# Patient Record
Sex: MALE | Race: OTHER | HISPANIC OR LATINO | Employment: UNEMPLOYED | URBAN - METROPOLITAN AREA
[De-identification: names, ages, dates, MRNs, and addresses within clinical notes are randomized per-mention and may not be internally consistent; named-entity substitution may affect disease eponyms.]

---

## 2020-08-25 ENCOUNTER — APPOINTMENT (EMERGENCY)
Dept: RADIOLOGY | Facility: HOSPITAL | Age: 40
End: 2020-08-25
Payer: COMMERCIAL

## 2020-08-25 ENCOUNTER — HOSPITAL ENCOUNTER (EMERGENCY)
Facility: HOSPITAL | Age: 40
Discharge: HOME | End: 2020-08-25
Attending: EMERGENCY MEDICINE | Admitting: EMERGENCY MEDICINE
Payer: COMMERCIAL

## 2020-08-25 VITALS
OXYGEN SATURATION: 97 % | HEART RATE: 98 BPM | SYSTOLIC BLOOD PRESSURE: 139 MMHG | RESPIRATION RATE: 16 BRPM | DIASTOLIC BLOOD PRESSURE: 82 MMHG

## 2020-08-25 DIAGNOSIS — S56.912A MUSCLE STRAIN OF LEFT FOREARM, INITIAL ENCOUNTER: ICD-10-CM

## 2020-08-25 DIAGNOSIS — M25.522 LEFT ELBOW PAIN: Primary | ICD-10-CM

## 2020-08-25 PROCEDURE — 99284 EMERGENCY DEPT VISIT MOD MDM: CPT | Performed by: PHYSICIAN ASSISTANT

## 2020-08-25 PROCEDURE — 99283 EMERGENCY DEPT VISIT LOW MDM: CPT

## 2020-08-25 PROCEDURE — 73080 X-RAY EXAM OF ELBOW: CPT

## 2020-08-25 PROCEDURE — 96372 THER/PROPH/DIAG INJ SC/IM: CPT

## 2020-08-25 RX ORDER — IBUPROFEN 800 MG/1
800 TABLET ORAL EVERY 6 HOURS PRN
Qty: 30 TABLET | Refills: 0 | Status: SHIPPED | OUTPATIENT
Start: 2020-08-25

## 2020-08-25 RX ORDER — METHOCARBAMOL 500 MG/1
500 TABLET, FILM COATED ORAL 4 TIMES DAILY PRN
Qty: 20 TABLET | Refills: 0 | Status: SHIPPED | OUTPATIENT
Start: 2020-08-25

## 2020-08-25 RX ORDER — KETOROLAC TROMETHAMINE 30 MG/ML
15 INJECTION, SOLUTION INTRAMUSCULAR; INTRAVENOUS ONCE
Status: COMPLETED | OUTPATIENT
Start: 2020-08-25 | End: 2020-08-25

## 2020-08-25 RX ADMIN — KETOROLAC TROMETHAMINE 15 MG: 30 INJECTION, SOLUTION INTRAMUSCULAR at 12:27

## 2020-08-25 NOTE — ED PROVIDER NOTES
History  Chief Complaint   Patient presents with    Elbow Pain     pt was lifting something and elbow felt like it popped  now had pain above and below elbow but can bend it     W S  is 44year old male presenting to the ED with left elbow pain  Patient states he pulled a tarp out from under a smoker and immediately felt a "pop" and pain in his left elbow area  He is reporting generalized pain in his left biceps muscles, anterior and medial elbow region  Patient reports pain with elbow extension and some minor pain with flexion  He is reporting an intermittent tingling sensation traveling down his elbow in the ulnar nerve distribution  He denies weakness and numbness  He denies previous injury to his left arm  Patient is left handed  None       History reviewed  No pertinent past medical history  History reviewed  No pertinent surgical history  History reviewed  No pertinent family history  I have reviewed and agree with the history as documented  E-Cigarette/Vaping    E-Cigarette Use Never User      E-Cigarette/Vaping Substances     Social History     Tobacco Use    Smoking status: Current Every Day Smoker     Packs/day: 0 50    Smokeless tobacco: Never Used   Substance Use Topics    Alcohol use: Not on file    Drug use: Not on file       Review of Systems   Constitutional: Negative for chills and fever  HENT: Negative for congestion and sore throat  Eyes: Negative for photophobia and visual disturbance  Respiratory: Negative for cough and shortness of breath  Cardiovascular: Negative for chest pain and leg swelling  Gastrointestinal: Negative for abdominal pain, diarrhea, nausea and vomiting  Genitourinary: Negative for difficulty urinating and dysuria  Musculoskeletal: Positive for arthralgias and myalgias  Negative for back pain, joint swelling, neck pain and neck stiffness  Skin: Negative for rash     Neurological: Negative for dizziness, light-headedness, numbness and headaches  Physical Exam  Physical Exam  Vitals signs and nursing note reviewed  Constitutional:       General: He is not in acute distress  Appearance: Normal appearance  He is not ill-appearing or toxic-appearing  Comments: Well appearing male sitting on exam table in no acute distress  HENT:      Head: Normocephalic and atraumatic  Right Ear: External ear normal       Left Ear: External ear normal    Eyes:      Extraocular Movements: Extraocular movements intact  Conjunctiva/sclera: Conjunctivae normal    Neck:      Musculoskeletal: Normal range of motion and neck supple  Cardiovascular:      Rate and Rhythm: Normal rate and regular rhythm  Pulses: Normal pulses  Heart sounds: Normal heart sounds  No murmur  No friction rub  No gallop  Pulmonary:      Effort: Pulmonary effort is normal  No respiratory distress  Breath sounds: Normal breath sounds  No wheezing, rhonchi or rales  Abdominal:      General: Bowel sounds are normal       Palpations: Abdomen is soft  Tenderness: There is no abdominal tenderness  Musculoskeletal: Normal range of motion  Comments: Generalized TTP in left biceps area, pronator teres and flexor carpi ulnaris muscles  Patient reports some pain with elbow extension  Normal elbow ROM  UE strength intact  No muscle laxity noted  Sensation intact  Brachial, radial and ulnar pulses 2+ bilaterally  Skin:     General: Skin is warm and dry  Capillary Refill: Capillary refill takes less than 2 seconds  Neurological:      General: No focal deficit present  Mental Status: He is alert and oriented to person, place, and time  Psychiatric:         Mood and Affect: Mood normal          Behavior: Behavior normal          Thought Content:  Thought content normal          Judgment: Judgment normal          Vital Signs  ED Triage Vitals   Temp Pulse Respirations Blood Pressure SpO2   -- 08/25/20 1133 08/25/20 1200 08/25/20 1202 08/25/20 1133    98 16 139/82 97 %      Temp src Heart Rate Source Patient Position - Orthostatic VS BP Location FiO2 (%)   -- 08/25/20 1200 08/25/20 1200 08/25/20 1200 --    Monitor Sitting Right arm       Pain Score       08/25/20 1227       No Pain           Vitals:    08/25/20 1133 08/25/20 1200 08/25/20 1202   BP:   139/82   Pulse: 98     Patient Position - Orthostatic VS:  Sitting          Visual Acuity      ED Medications  Medications   ketorolac (TORADOL) injection 15 mg (15 mg Intramuscular Given 8/25/20 1227)       Diagnostic Studies  Results Reviewed     None                 XR elbow 3+ views LEFT   Final Result by Frances Olmstead MD (08/25 1454)      No acute osseous abnormality  Workstation performed: [id number]                    Procedures  Procedures         ED Course                                             MDM  Number of Diagnoses or Management Options  Left elbow pain:   Muscle strain of left forearm, initial encounter:   Diagnosis management comments: Toradol administered in ED which provided some pain relief  No acute fracture seen on x-ray  Ace bandage applied for comfort  Instructed patient not to sleep with ace bandage on  Rx methocarbamol and ibuprofen PRN  Instructed patient to follow-up with orthopedics and PCP  The management plan was discussed in detail with the patient at bedside and all questions were answered  Prior to discharge, he was provided both verbal and written instructions  Discussed signs and symptoms for which to return to the emergency department  All questions were answered and patient was comfortable with the plan of care and discharged to home  Instructed the patient to follow up with Ortho and PCP as provided in his written instructions  Patient verbalized understanding of our discussion and plan of care, and agrees to return to the Emergency Department for concerns and progression of illness           Amount and/or Complexity of Data Reviewed  Tests in the radiology section of CPT®: ordered and reviewed          Disposition  Final diagnoses:   Left elbow pain   Muscle strain of left forearm, initial encounter     Time reflects when diagnosis was documented in both MDM as applicable and the Disposition within this note     Time User Action Codes Description Comment    8/25/2020  1:05 PM Dulbrandon Calderon [[id number]] Left elbow pain     8/25/2020  1:06 PM Addie Calderon [[id number]] Muscle strain of left forearm, initial encounter       ED Disposition     ED Disposition Condition Date/Time Comment    Discharge Stable Tue Aug 25, 2020  1:05 PM Jovana Espana discharge to home/self care  Follow-up Information     Follow up With Specialties Details Why Contact Info Additional Information    Tonya Almonte DO Family Medicine In 1 week  [address] [facility] Orthopedic Surgery In 1 week  [address] [facility], [address] [facility]          Discharge Medication List as of 8/25/2020  1:10 PM      START taking these medications    Details   ibuprofen (MOTRIN) 800 mg tablet Take 1 tablet (800 mg total) by mouth every 6 (six) hours as needed for mild pain, Starting Tue 8/25/2020, Normal      methocarbamol (ROBAXIN) 500 mg tablet Take 1 tablet (500 mg total) by mouth 4 (four) times a day as needed for muscle spasms, Starting Tue 8/25/2020, Normal           No discharge procedures on file      PDMP Review     None          ED Provider  Electronically Signed by           Kadi Laura PA-C  08/30/20 5035

## 2020-08-25 NOTE — DISCHARGE INSTRUCTIONS
Takemethocarbamol as needed for muscle pain  Do not drive while taking methocarbamol  Take ibuprofen as needed for pain/inflammation  Follow-up with PCP in 1 week  Follow-up with orthopedics  Return to the emergency department if you develop worsening symptoms such as numbness, inability to use your arm, shortness of breath or chest pain

## 2020-09-11 VITALS
DIASTOLIC BLOOD PRESSURE: 85 MMHG | HEART RATE: 89 BPM | WEIGHT: 260 LBS | HEIGHT: 73 IN | BODY MASS INDEX: 34.46 KG/M2 | SYSTOLIC BLOOD PRESSURE: 134 MMHG

## 2020-09-11 DIAGNOSIS — S46.212A RUPTURE OF LEFT DISTAL BICEPS TENDON, INITIAL ENCOUNTER: Primary | ICD-10-CM

## 2020-09-11 PROCEDURE — 99203 OFFICE O/P NEW LOW 30 MIN: CPT | Performed by: EMERGENCY MEDICINE

## 2020-09-11 NOTE — PATIENT INSTRUCTIONS
Tendon Rupture   WHAT YOU NEED TO KNOW:   A tendon rupture is a partial or complete tear of your tendon  Tendons are tough bands of tissue that attach your muscles to your bones  A tear may be caused by an injury or increased pressure on the tendon that occurs during sports or a fall  Your risk may be higher if you have a weak tendon  Weak tendons may be caused by tendonitis, use of steroids, older age, and chronic conditions such as arthritis  DISCHARGE INSTRUCTIONS:   Seek care immediately if:   · You have severe pain in the injured area, even after you take medicine  · Your arm or leg feels warm, tender, and painful  It may look swollen and red  · You feel lightheaded, short of breath, and have chest pain  · You cough up blood  Contact your healthcare provider if:   · Your symptoms do not get better with treatment  · You feel another pop, snap, or crack in your tendon  · You have questions or concerns about your condition or care  Medicines:   · NSAIDs , such as ibuprofen, help decrease swelling, pain, and fever  This medicine is available with or without a doctor's order  NSAIDs can cause stomach bleeding or kidney problems in certain people  If you take blood thinner medicine, always ask your healthcare provider if NSAIDs are safe for you  Always read the medicine label and follow directions  · Acetaminophen  decreases pain  It is available without a doctor's order  Ask how much to take and how often to take it  Follow directions  Acetaminophen can cause liver damage if not taken correctly  · Take your medicine as directed  Contact your healthcare provider if you think your medicine is not helping or if you have side effects  Tell him or her if you are allergic to any medicine  Keep a list of the medicines, vitamins, and herbs you take  Include the amounts, and when and why you take them  Bring the list or the pill bottles to follow-up visits   Carry your medicine list with you in case of an emergency  Follow up with your healthcare provider as directed:  Write down your questions so you remember to ask them during your visits  Self-care:   · Rest  the injured tendon until pain and swelling have decreased  Ask your healthcare provider what activities you can do while your tendon heals  · Apply ice  on your tendon for 15 to 20 minutes every hour for 48 hours or as directed  Use an ice pack, or put crushed ice in a plastic bag  Cover it with a towel  Ice helps prevent tissue damage and decreases swelling and pain  · Compress  the injury with an elastic bandage, air cast, medical boot, or splint to reduce swelling  Ask your healthcare provider which compression device to use, and how tight it should be  · Elevate  the injured area above the level of your heart as often as you can  This will help decrease swelling and pain  If possible, prop the injured area on pillows or blankets to keep it elevated comfortably  Wear support devices as directed  You may need a brace, cast, or splint to limit movement and protect your tendon  If the tendon rupture is in your leg, you may need to use crutches  This will decrease pain as you move around  Go to physical therapy as directed:  A physical therapist teaches you exercises to help improve movement and strength  © 2017 [address] [facility] Information is for End User's use only and may not be sold, redistributed or otherwise used for commercial purposes  All illustrations and images included in CareNotes® are the copyrighted property of [employer] , [employer]  or [employer]  The above information is an  only  It is not intended as medical advice for individual conditions or treatments  Talk to your doctor, nurse or pharmacist before following any medical regimen to see if it is safe and effective for you

## 2020-09-11 NOTE — PROGRESS NOTES
Assessment/Plan:    Diagnoses and all orders for this visit:    Rupture of left distal biceps tendon, initial encounter  -     MRI elbow left wo contrast; Future     Patient with possible partial tear or rupture of the distal biceps tendon I have recommended an MRI for further evaluation and to graded the injury  As the patient is young and active the patient will likely not want to sacrifice function and having him follow up with the orthopedic surgeon would likely be of benefit  Continue ibuprofen and activity modification  Return for Will call wi results  Chief Complaint:     Chief Complaint   Patient presents with    Left Elbow - Pain       Subjective:   Patient ID: Erasmo Gutierrez is a 44 y o  male  DOI 8/25  New patient presents for left elbow injury occurring while pulling a tarp out felt a pop of the elbow and subsequent pain anteriorly  He was evaluated in the emergency department x-rays were obtained and within normal limits  Patient is normally very active he is an    He has been taking ibuprofen how only as needed  He also was prescribed a muscle relaxant  He does notice a abnormality or defect of his left biceps  He has been having trouble making a biceps muscle and flexing the elbow  Review of Systems   Constitutional: Negative for chills and fever  HENT: Negative for sore throat and trouble swallowing  Eyes: Negative for pain and discharge  Respiratory: Negative for choking and shortness of breath  Cardiovascular: Negative for chest pain and palpitations  Gastrointestinal: Negative for abdominal pain and vomiting  Endocrine: Negative for cold intolerance and heat intolerance  Musculoskeletal: Positive for arthralgias and joint swelling  Neurological: Negative for dizziness and weakness  Psychiatric/Behavioral: Negative for self-injury and suicidal ideas         The following portions of the patient's chart were reviewed and updated as appropriate: Allergy:  No Known Allergies    History reviewed  No pertinent past medical history  History reviewed  No pertinent surgical history  Social History     Socioeconomic History    Marital status: Single     Spouse name: Not on file    Number of children: Not on file    Years of education: Not on file    Highest education level: Not on file   Occupational History    Not on file   Social Needs    Financial resource strain: Not on file    Food insecurity     Worry: Not on file     Inability: Not on file    Transportation needs     Medical: Not on file     Non-medical: Not on file   Tobacco Use    Smoking status: Current Every Day Smoker     Packs/day: 0 50    Smokeless tobacco: Never Used   Substance and Sexual Activity    Alcohol use: Not on file    Drug use: Not on file    Sexual activity: Not on file   Lifestyle    Physical activity     Days per week: Not on file     Minutes per session: Not on file    Stress: Not on file   Relationships    Social connections     Talks on phone: Not on file     Gets together: Not on file     Attends Jewish service: Not on file     Active member of club or organization: Not on file     Attends meetings of clubs or organizations: Not on file     Relationship status: Not on file    Intimate partner violence     Fear of current or ex partner: Not on file     Emotionally abused: Not on file     Physically abused: Not on file     Forced sexual activity: Not on file   Other Topics Concern    Not on file   Social History Narrative    Not on file       History reviewed  No pertinent family history      Medications:    Current Outpatient Medications:     ibuprofen (MOTRIN) 800 mg tablet, Take 1 tablet (800 mg total) by mouth every 6 (six) hours as needed for mild pain, Disp: 30 tablet, Rfl: 0    methocarbamol (ROBAXIN) 500 mg tablet, Take 1 tablet (500 mg total) by mouth 4 (four) times a day as needed for muscle spasms, Disp: 20 tablet, Rfl: 0    There is no problem list on file for this patient  Objective:  /85   Pulse 89   Ht 6' 1" (1 854 m)   Wt 118 kg (260 lb)   BMI 34 30 kg/m²     Left Elbow Exam     Tenderness   The patient is experiencing tenderness in the medial epicondyle (Distal biceps tendon)  Range of Motion   The patient has normal left elbow ROM  Other   Erythema: absent  Sensation: normal  Pulse: present    Comments:  Pain reproduced with supination with the elbow at 90°  There is no ecchymosis    There is a slight deformity of the distal biceps laterally  Upon palpation the distal biceps tendon is less prominent than the right and there seems to be a partial tear  Physical Exam  Vitals signs reviewed  Constitutional:       Appearance: He is well-developed  HENT:      Head: Normocephalic and atraumatic  Eyes:      Conjunctiva/sclera: Conjunctivae normal    Neck:      Musculoskeletal: Neck supple  Pulmonary:      Effort: Pulmonary effort is normal    Skin:     General: Skin is warm and dry  Neurological:      Mental Status: He is alert and oriented to person, place, and time  Psychiatric:         Behavior: Behavior normal            Neurologic Exam     Mental Status   Oriented to person, place, and time  Procedures    I have personally reviewed the written report of the pertinent studies  LEFT ELBOW     INDICATION:   lifting something  "felt pop"      COMPARISON:  None     VIEWS:  XR ELBOW 3+ VW LEFT         FINDINGS:     There is no acute fracture or dislocation      No evidence of joint effusion      Mild degenerative changes at the ulnohumeral joint      No lytic or blastic osseous lesion      Soft tissues are unremarkable      IMPRESSION:     No acute osseous abnormality

## 2020-09-22 ENCOUNTER — HOSPITAL ENCOUNTER (OUTPATIENT)
Dept: MRI IMAGING | Facility: HOSPITAL | Age: 40
Discharge: HOME | End: 2020-09-22
Attending: EMERGENCY MEDICINE
Payer: COMMERCIAL

## 2020-09-22 DIAGNOSIS — S46.212A RUPTURE OF LEFT DISTAL BICEPS TENDON, INITIAL ENCOUNTER: ICD-10-CM

## 2020-09-22 PROCEDURE — 73221 MRI JOINT UPR EXTREM W/O DYE: CPT

## 2020-09-22 PROCEDURE — G1004 CDSM NDSC: HCPCS

## 2020-09-23 ENCOUNTER — TELEPHONE (OUTPATIENT)
Dept: OBGYN CLINIC | Facility: MEDICAL CENTER | Age: 40
End: 2020-09-23

## 2020-09-23 DIAGNOSIS — S46.212A RUPTURE OF LEFT DISTAL BICEPS TENDON, INITIAL ENCOUNTER: Primary | ICD-10-CM

## 2020-09-23 NOTE — TELEPHONE ENCOUNTER
Patient sees Dr Iraj Lang  Patient is calling missed call from Ingris Hartman about his MRI  He is looking for a call back       # [phone]

## 2022-11-29 ENCOUNTER — OFFICE VISIT (OUTPATIENT)
Dept: FAMILY MEDICINE CLINIC | Facility: CLINIC | Age: 42
End: 2022-11-29

## 2022-11-29 ENCOUNTER — TELEPHONE (OUTPATIENT)
Dept: FAMILY MEDICINE CLINIC | Facility: CLINIC | Age: 42
End: 2022-11-29

## 2022-11-29 ENCOUNTER — APPOINTMENT (OUTPATIENT)
Dept: LAB | Facility: MEDICAL CENTER | Age: 42
End: 2022-11-29

## 2022-11-29 VITALS
WEIGHT: 261 LBS | HEART RATE: 80 BPM | RESPIRATION RATE: 12 BRPM | DIASTOLIC BLOOD PRESSURE: 80 MMHG | OXYGEN SATURATION: 98 % | HEIGHT: 73 IN | SYSTOLIC BLOOD PRESSURE: 160 MMHG | BODY MASS INDEX: 34.59 KG/M2

## 2022-11-29 DIAGNOSIS — Z11.59 NEED FOR HEPATITIS C SCREENING TEST: ICD-10-CM

## 2022-11-29 DIAGNOSIS — Z11.4 SCREENING FOR HIV (HUMAN IMMUNODEFICIENCY VIRUS): ICD-10-CM

## 2022-11-29 DIAGNOSIS — R21 DIFFUSE PAPULAR RASH: ICD-10-CM

## 2022-11-29 DIAGNOSIS — R21 DIFFUSE PAPULAR RASH: Primary | ICD-10-CM

## 2022-11-29 LAB
ALBUMIN SERPL BCP-MCNC: 4.1 G/DL (ref 3.5–5)
ALP SERPL-CCNC: 51 U/L (ref 46–116)
ALT SERPL W P-5'-P-CCNC: 30 U/L (ref 12–78)
ANION GAP SERPL CALCULATED.3IONS-SCNC: 5 MMOL/L (ref 4–13)
AST SERPL W P-5'-P-CCNC: 11 U/L (ref 5–45)
BASOPHILS # BLD AUTO: 0.04 THOUSANDS/ÂΜL (ref 0–0.1)
BASOPHILS NFR BLD AUTO: 0 % (ref 0–1)
BILIRUB SERPL-MCNC: 0.27 MG/DL (ref 0.2–1)
BILIRUB UR QL STRIP: NEGATIVE
BUN SERPL-MCNC: 10 MG/DL (ref 5–25)
CALCIUM SERPL-MCNC: 9.3 MG/DL (ref 8.3–10.1)
CHLORIDE SERPL-SCNC: 106 MMOL/L (ref 96–108)
CLARITY UR: NORMAL
CO2 SERPL-SCNC: 27 MMOL/L (ref 21–32)
COLOR UR: NORMAL
CREAT SERPL-MCNC: 0.93 MG/DL (ref 0.6–1.3)
EOSINOPHIL # BLD AUTO: 0.88 THOUSAND/ÂΜL (ref 0–0.61)
EOSINOPHIL NFR BLD AUTO: 9 % (ref 0–6)
ERYTHROCYTE [DISTWIDTH] IN BLOOD BY AUTOMATED COUNT: 15.3 % (ref 11.6–15.1)
GFR SERPL CREATININE-BSD FRML MDRD: 100 ML/MIN/1.73SQ M
GLUCOSE SERPL-MCNC: 99 MG/DL (ref 65–140)
GLUCOSE UR STRIP-MCNC: NEGATIVE MG/DL
HCT VFR BLD AUTO: 45.7 % (ref 36.5–49.3)
HGB BLD-MCNC: 14.1 G/DL (ref 12–17)
HGB UR QL STRIP.AUTO: NEGATIVE
IMM GRANULOCYTES # BLD AUTO: 0.07 THOUSAND/UL (ref 0–0.2)
IMM GRANULOCYTES NFR BLD AUTO: 1 % (ref 0–2)
KETONES UR STRIP-MCNC: NEGATIVE MG/DL
LEUKOCYTE ESTERASE UR QL STRIP: NEGATIVE
LYMPHOCYTES # BLD AUTO: 2.37 THOUSANDS/ÂΜL (ref 0.6–4.47)
LYMPHOCYTES NFR BLD AUTO: 24 % (ref 14–44)
MCH RBC QN AUTO: 26.9 PG (ref 26.8–34.3)
MCHC RBC AUTO-ENTMCNC: 30.9 G/DL (ref 31.4–37.4)
MCV RBC AUTO: 87 FL (ref 82–98)
MONOCYTES # BLD AUTO: 0.65 THOUSAND/ÂΜL (ref 0.17–1.22)
MONOCYTES NFR BLD AUTO: 7 % (ref 4–12)
NEUTROPHILS # BLD AUTO: 5.97 THOUSANDS/ÂΜL (ref 1.85–7.62)
NEUTS SEG NFR BLD AUTO: 59 % (ref 43–75)
NITRITE UR QL STRIP: NEGATIVE
NRBC BLD AUTO-RTO: 0 /100 WBCS
PH UR STRIP.AUTO: 7.5 [PH]
PLATELET # BLD AUTO: 358 THOUSANDS/UL (ref 149–390)
PMV BLD AUTO: 9.2 FL (ref 8.9–12.7)
POTASSIUM SERPL-SCNC: 4 MMOL/L (ref 3.5–5.3)
PROT SERPL-MCNC: 7.8 G/DL (ref 6.4–8.4)
PROT UR STRIP-MCNC: NEGATIVE MG/DL
RBC # BLD AUTO: 5.25 MILLION/UL (ref 3.88–5.62)
SODIUM SERPL-SCNC: 138 MMOL/L (ref 135–147)
SP GR UR STRIP.AUTO: 1.02 (ref 1–1.03)
UROBILINOGEN UR STRIP-ACNC: <2 MG/DL
WBC # BLD AUTO: 9.98 THOUSAND/UL (ref 4.31–10.16)

## 2022-11-29 RX ORDER — PREDNISONE 10 MG/1
TABLET ORAL
Qty: 40 TABLET | Refills: 0 | Status: SHIPPED | OUTPATIENT
Start: 2022-11-29

## 2022-11-29 NOTE — TELEPHONE ENCOUNTER
Call received, pharmacy stated pt would need 44 prednisone tablets to complete their taper  Verbal given to change the quantity from 40 pills to 44  Camacho Gallagher was present and helped

## 2022-11-29 NOTE — ASSESSMENT & PLAN NOTE
Seems to be allergic in a etiology  Will start him on prednisone taper for 12 days  Will run CBC, CMP, IgE, RPR, HIV and hep C screen  He will update me in 3-4 days on his progress

## 2022-11-29 NOTE — PROGRESS NOTES
Assessment/Plan:    Diffuse papular rash  Seems to be allergic in a etiology  Will start him on prednisone taper for 12 days  Will run CBC, CMP, IgE, RPR, HIV and hep C screen  He will update me in 3-4 days on his progress  Diagnoses and all orders for this visit:    Diffuse papular rash  -     Comprehensive metabolic panel; Future  -     CBC and differential; Future  -     UA (URINE) with reflex to Scope  -     RPR; Future  -     IgE; Future  -     predniSONE 10 mg tablet; Start 5 pills on day 1, 2 and 3, 4 pills on day 4, 5, 6, 7, 3 pills on day 8, 9 and 10, 2 pills on day 11 and 12 then stop  Screening for HIV (human immunodeficiency virus)  -     HIV 1/2 Antigen/Antibody (4th Generation) w Reflex [facility]; Future    Need for hepatitis C screening test  -     Hepatitis C Antibody ([facility]); Future          Subjective:      Patient ID: Twyla Drew is a 43 y o  male  Patient came today with concerns of diffuse macular papular rash that developed few days ago after he was treated with Keflex for cellulitis of his chest he was switched to clindamycin and he finished full course  He was also treated with 5 days of prednisone  He reported that being on prednisone his rash improved  He denies any systemic symptoms such as chills, fevers  Denies any diarrhea, abdominal pain  He said that recently he went to vacation to [place]        The following portions of the patient's history were reviewed and updated as appropriate: allergies, current medications, past family history, past medical history, past social history, past surgical history, and problem list     Review of Systems   Constitutional: Negative for chills, fatigue and fever  Respiratory: Negative for cough, chest tightness and shortness of breath  Cardiovascular: Negative for chest pain, palpitations and leg swelling     Gastrointestinal: Negative for abdominal distention, abdominal pain, blood in stool, constipation, diarrhea and nausea  Genitourinary: Negative for difficulty urinating and dysuria  Musculoskeletal: Negative for arthralgias, back pain, gait problem, joint swelling, myalgias and neck pain  Skin: Positive for rash  Neurological: Negative for dizziness, weakness, numbness and headaches  Psychiatric/Behavioral: Negative for agitation  Objective:      /80 (BP Location: Left arm, Patient Position: Sitting, Cuff Size: Standard)   Pulse 80   Resp 12   Ht 6' 1" (1 854 m)   Wt 118 kg (261 lb)   SpO2 98%   BMI 34 43 kg/m²     Allergies   Allergen Reactions   • Cephalexin Rash          Current Outpatient Medications:   •  predniSONE 10 mg tablet, Start 5 pills on day 1, 2 and 3, 4 pills on day 4, 5, 6, 7, 3 pills on day 8, 9 and 10, 2 pills on day 11 and 12 then stop , Disp: 40 tablet, Rfl: 0  •  ibuprofen (MOTRIN) 800 mg tablet, Take 1 tablet (800 mg total) by mouth every 6 (six) hours as needed for mild pain, Disp: 30 tablet, Rfl: 0  •  methocarbamol (ROBAXIN) 500 mg tablet, Take 1 tablet (500 mg total) by mouth 4 (four) times a day as needed for muscle spasms, Disp: 20 tablet, Rfl: 0     There are no Patient Instructions on file for this visit  Physical Exam  Vitals reviewed  Constitutional:       General: He is not in acute distress  Appearance: He is not ill-appearing or toxic-appearing  Cardiovascular:      Rate and Rhythm: Normal rate  Heart sounds: No murmur heard  No gallop  Pulmonary:      Effort: No respiratory distress  Breath sounds: No wheezing or rales  Skin:     Findings: Rash (Diffuse macular papular rash throughout his body ) present

## 2022-11-30 LAB
HCV AB SER QL: NORMAL
HIV 1+2 AB+HIV1 P24 AG SERPL QL IA: NORMAL
RPR SER QL: NORMAL
TOTAL IGE SMQN RAST: 365 KU/L (ref 0–113)

## 2022-12-13 ENCOUNTER — OFFICE VISIT (OUTPATIENT)
Dept: FAMILY MEDICINE CLINIC | Facility: CLINIC | Age: 42
End: 2022-12-13

## 2022-12-13 VITALS
OXYGEN SATURATION: 98 % | BODY MASS INDEX: 34.59 KG/M2 | SYSTOLIC BLOOD PRESSURE: 152 MMHG | DIASTOLIC BLOOD PRESSURE: 100 MMHG | HEART RATE: 84 BPM | WEIGHT: 261 LBS | HEIGHT: 73 IN | RESPIRATION RATE: 12 BRPM

## 2022-12-13 DIAGNOSIS — R21 DIFFUSE PAPULAR RASH: Primary | ICD-10-CM

## 2022-12-13 NOTE — PROGRESS NOTES
Assessment/Plan:    No problem-specific Assessment & Plan notes found for this encounter  Diagnoses and all orders for this visit:    Diffuse papular rash  -     Ambulatory Referral to Dermatology; Future      Will place referral to dermatology  We will try to get earlier appointment  Subjective:      Patient ID: Lacy Plasencia is a 43 y o  male  Patient with diffuse rash that started after a trip to [facility]   First it started around his nipples then he was treated with antibiotic for suspected cellulitis and after that his rash became diffuse throughout his body  It also involves his palms  Initial blood work was done which was negative for HIV, syphilis  He CBC revealed he was in the failure, CMP was okay  He denies any systemic symptoms, no fevers, chills, arthralgia that was associated with this rash  She denies eating raw food, no unprotected sexual intercourse  He was tried on oral prednisone taper which seems did not help much  The following portions of the patient's history were reviewed and updated as appropriate: allergies, current medications, past family history, past medical history, past social history, past surgical history, and problem list     Review of Systems   Constitutional: Negative for chills, fatigue and fever  Respiratory: Negative for cough, chest tightness and shortness of breath  Cardiovascular: Negative for chest pain, palpitations and leg swelling  Gastrointestinal: Negative for abdominal distention, abdominal pain, blood in stool, constipation, diarrhea and nausea  Genitourinary: Negative for difficulty urinating and dysuria  Musculoskeletal: Negative for arthralgias, back pain, gait problem, joint swelling, myalgias and neck pain  Skin: Positive for rash  Neurological: Negative for dizziness, weakness, numbness and headaches  Psychiatric/Behavioral: Negative for agitation           Objective:      /100 (BP Location: Left arm, Patient Position: Sitting, Cuff Size: Standard)   Pulse 84   Resp 12   Ht 6' 1" (1 854 m)   Wt 118 kg (261 lb)   SpO2 98%   BMI 34 43 kg/m²     Allergies   Allergen Reactions   • Cephalexin Rash          Current Outpatient Medications:   •  ibuprofen (MOTRIN) 800 mg tablet, Take 1 tablet (800 mg total) by mouth every 6 (six) hours as needed for mild pain (Patient not taking: Reported on 12/13/2022), Disp: 30 tablet, Rfl: 0  •  methocarbamol (ROBAXIN) 500 mg tablet, Take 1 tablet (500 mg total) by mouth 4 (four) times a day as needed for muscle spasms (Patient not taking: Reported on 12/13/2022), Disp: 20 tablet, Rfl: 0  •  predniSONE 10 mg tablet, Start 5 pills on day 1, 2 and 3, 4 pills on day 4, 5, 6, 7, 3 pills on day 8, 9 and 10, 2 pills on day 11 and 12 then stop  (Patient not taking: Reported on 12/13/2022), Disp: 40 tablet, Rfl: 0     There are no Patient Instructions on file for this visit  Physical Exam  Vitals reviewed  Constitutional:       General: He is not in acute distress  Appearance: He is not ill-appearing or toxic-appearing  Cardiovascular:      Rate and Rhythm: Normal rate  Heart sounds: No murmur heard  No gallop  Pulmonary:      Effort: No respiratory distress  Breath sounds: No wheezing or rales  Skin:     Findings: Rash (Diffuse macular papular rash throughout his body ) present

## 2022-12-14 ENCOUNTER — CONSULT (OUTPATIENT)
Dept: DERMATOLOGY | Facility: CLINIC | Age: 42
End: 2022-12-14

## 2022-12-14 VITALS — HEIGHT: 73 IN | WEIGHT: 261 LBS | BODY MASS INDEX: 34.59 KG/M2 | TEMPERATURE: 98 F

## 2022-12-14 DIAGNOSIS — R21 DIFFUSE PAPULAR RASH: ICD-10-CM

## 2022-12-14 RX ORDER — PREDNISONE 50 MG/1
50 TABLET ORAL DAILY
Qty: 10 TABLET | Refills: 0 | Status: SHIPPED | OUTPATIENT
Start: 2022-12-14

## 2022-12-14 NOTE — PATIENT INSTRUCTIONS
SKIN PUNCH BIOPSY    Rationale for Procedure  A skin punch biopsy allows the dermatologist to further examine a lesion or rash under the microscope to potentially obtain a more specific diagnosis  It usually involves numbing the area with numbing medication and removing a small piece of skin  Usually, the area will be closed with sutures at the end of the procedure  Sutures usually need to be removed in 5 to 7 days of the biopsy was performed on the face or in 10 to 14 days if performed elsewhere on the body  Description of Procedure  We would like to perform a skin punch biopsy today  A local anesthetic, similar to the kind that a dentist uses when filling a cavity, will be injected with a very small needle into the skin area to be sampled  The injected skin and tissue underneath should “go to sleep” and become numbed so that no further pain should be felt  An instrument shaped like a tiny "cookie cutter" (i e , the punch biopsy instrument) will be used to cut a small round piece of skin and tissue from the area so that the sample may be taken and examined more closely under the microscope  A slight amount of bleeding will occur, but it is usually stopped with direct pressure, chemical cautery, and/or a pressure bandage; rarely, electrocautery and other means of intervention may be necessary to help stop the bleeding  A few sutures/stitches are usually applied to help aid in wound healing  Surgical “Vaseline-type” ointment will also applied after the procedure to help create a barrier between the wound and the outside world      Risks and Potential Complications  While the advantage of a skin punch biopsy is that it allows us to potentially examine the skin more closely under the microscope, there are some risks and potential complications that include but are not limited to the following:  Bleeding  Infection  Pain  Scar/keloid  Skin discoloration  Incomplete removal of the lesion or rash being sampled (in other words, this procedure is intended as a sampling and is not considered a definitive treatment)  Recurrence of the lesion or rash being sampled  Nerve Damage/Numbness/Loss of Function  Allergic Reaction to Anesthesia  Biopsies are diagnostic procedures and, based on findings, additional treatment or evaluation may be required (in other words, this procedure is intended as a sampling and is not considered a definitive treatment)  Loss or destruction of the sample specimen could result in no additional findings  The person at the microscope may not be able to provide additional information other than what we already know or suspect  What You Will Need to Do After the Procedure  Keep the area clean and dry  Try NOT to remove the bandage for the first 24 hours  Gently clean the area and apply Vaseline ointment (this is over the counter and not a prescription) to the biopsy site for up to 2 weeks  If any sutures were placed, return for suture removal as instructed (generally 1 week for the face, 2 weeks for the body)  Take Acetaminophen (Tylenol) for discomfort, if no contraindications  Do NOT take Ibuprofen or aspirin unless specifically told to do so by your Dermatologist because these medications can make bleeding worse  Call our office immediately for signs of infection: fever, chills, increased redness, warmth, tenderness, discomfort/pain, or pus or foul smell coming from the wound  If a small amount of bleeding is noticed, place a clean cloth over the area and apply firm pressure for ten minutes  Check the wound ONLY after 10 minutes of direct pressure; do not cheat and sneak a peak, as that does not count  If bleeding persists after 10 minutes of legitimate direct pressure, then try one more round of direct pressure for an additional 10 minutes to the area    Should the bleeding become heavier or not stop after the second attempt, call [facility] directly at [phone] (SKIN) or, if after hours, go to your local Emergency Room/Emergency Department

## 2022-12-14 NOTE — PROGRESS NOTES
[facility] Note     Patient Name: Erasmo Gutierrez  Encounter Date: 12/14/2022     Have you been cared for by a [place] [address] Dermatologist in the last 3 years and, if so, which description applies to you? NO  I am considered a "new" patient and must complete all patient intake questions  I am MALE/not capable of bearing children  REVIEW OF SYSTEMS:  Have you recently had or currently have any of the following? · Recent fever or chills? No  · Any non-healing wound? No   PAST MEDICAL HISTORY:  Have you personally ever had or currently have any of the following? If "YES," then please provide more detail  · Skin cancer (such as Melanoma, Basal Cell Carcinoma, Squamous Cell Carcinoma? No  · Tuberculosis, HIV/AIDS, Hepatitis B or C: No  · Systemic Immunosuppression such as Diabetes, Biologic or Immunotherapy, Chemotherapy, Organ Transplantation, Bone Marrow Transplantation No  · Radiation Treatment No   FAMILY HISTORY:  Any "first degree relatives" (parent, brother, sister, or child) with the following? • Skin Cancer, Pancreatic or Other Cancer? No   PATIENT EXPERIENCE:    • Do you want the Dermatologist to perform a COMPLETE skin exam today including a clinical examination under the "bra and underwear" areas? Yes  • If necessary, do we have your permission to call and leave a detailed message on your Preferred Phone number that includes your specific medical information?   NO      Allergies   Allergen Reactions   • Cephalexin Rash      Current Outpatient Medications:   •  ibuprofen (MOTRIN) 800 mg tablet, Take 1 tablet (800 mg total) by mouth every 6 (six) hours as needed for mild pain (Patient not taking: Reported on 12/13/2022), Disp: 30 tablet, Rfl: 0  •  methocarbamol (ROBAXIN) 500 mg tablet, Take 1 tablet (500 mg total) by mouth 4 (four) times a day as needed for muscle spasms (Patient not taking: Reported on 12/13/2022), Disp: 20 tablet, Rfl: 0          • Whom besides the patient is providing clinical information about today's encounter?   o NO ADDITIONAL HISTORIAN (patient alone provided history)    Physical Exam and Assessment/Plan by Diagnosis:    RASH    Physical Exam:  • (Anatomic Location); (Size and Morphological Description); (Differential Diagnosis):  o Back, forearm, chest, neck with scaly red plaques: DDX:hypersensitivity  reaction eczema verus connective tissue disease    • Pertinent Positives:  • Pertinent Negatives: Additional History of Present Condition:  Patient states rash started in November and was given oral prednisone 10 mg which he finished on saturday  Patient was in [facility] and developed a sunburn that he developed a infection and was prescribed keflex  Assessment and Plan:  Based on a thorough discussion of this condition and the management approach to it (including a comprehensive discussion of the known risks, side effects and potential benefits of treatment), the patient (family) agrees to implement the following specific plan:  • Punch biopsy today       PROCEDURE NOTE:  PUNCH BIOPSY-A      Performing Physician: Adeline Ocampo    Anatomic Location; Clinical Description with size (cm); Pre-Op Diagnosis:   •  Right forearm; skin;scaly red plaques: DDX:hypersensitivity  Reaction versusu eczema verus connective tissue disease         Anesthesia: 1% Lidocaine HCL      Topical anesthesia: None   Indications: To indicate diagnosis and management plan  Procedure Details     Patient informed of the risks (including bleeding,scaring and infection) and benefits of the procedure explained  Verbal and written informed consent obtained  The area was prepped and draped in the usual fashion  Anesthesia was obtained with 1% lidocaine with epinephrine  The skin was then stretched perpendicular to the skin tension lines and a punch biopsy to an appropriate sampling depth was obtained with a 3 mm punch with a forceps and iris scissors       Hemostasis was obtained with 4-0 Ethilon x 1 sutures  Complications:  None      Specimen has been sent for review by Dermatopathology  Plan:  1  Instructed to keep the wound dry and covered for 24-48h and clean thereafter  2  Warning signs of infection were reviewed  3  Recommended that the patient use acetaminophen as needed for pain  4  Sutures if any should be removed in 14 days      Standard post-procedure care has been explained and has been included in written form within the patient's copy of Informed Consent      Scribe Attestation    I,:  Jarek Madrigal am acting as a scribe while in the presence of the attending physician :       I,:  Daniel Tripathi MD personally performed the services described in this documentation    as scribed in my presence :

## 2022-12-28 ENCOUNTER — OFFICE VISIT (OUTPATIENT)
Dept: DERMATOLOGY | Facility: CLINIC | Age: 42
End: 2022-12-28

## 2022-12-28 DIAGNOSIS — Z48.02 ENCOUNTER FOR REMOVAL OF SUTURES: Primary | ICD-10-CM

## 2022-12-28 NOTE — PROGRESS NOTES
Suture removal    Date/Time: 12/28/2022 12:21 PM  Performed by: Rafa Olivo RN  Authorized by: Ed Moraes MD   Universal Protocol:  Consent: Verbal consent obtained  Written consent not obtained  Risks and benefits: risks, benefits and alternatives were discussed  Consent given by: patient  Time out: Immediately prior to procedure a "time out" was called to verify the correct patient, procedure, equipment, support staff and site/side marked as required  Timeout called at: 12/28/2022 12:21 PM   Patient understanding: patient states understanding of the procedure being performed  Patient consent: the patient's understanding of the procedure matches consent given  Procedure consent: procedure consent matches procedure scheduled  Relevant documents: relevant documents not present or verified  Test results: test results available and properly labeled  Site marked: the operative site was marked  Radiology Images displayed and confirmed  If images not available, report reviewed: imaging studies not available  Patient identity confirmed: verbally with patient        Patient location:  Clinic  Location:     Laterality:  Right    Location:  Upper extremity    Upper extremity location:  Arm    Arm location:  R lower arm  Procedure details: Tools used:  Suture removal kit    Wound appearance:  No sign(s) of infection, good wound healing and nontender    Number of sutures removed:  1  Post-procedure details:     Post-removal:  Band-Aid applied (Vaseline applied )    Patient tolerance of procedure: Tolerated well, no immediate complications  Comments:      Patient instructed to follow up as needed

## 2023-01-03 ENCOUNTER — TELEPHONE (OUTPATIENT)
Dept: DERMATOLOGY | Facility: CLINIC | Age: 43
End: 2023-01-03

## 2023-01-03 NOTE — TELEPHONE ENCOUNTER
Patient left a message stating his rash is returning and he thinks he should consider/ try the medication that was discussed by Dr Adrianna Cadet, in your biopsy results this was noted: Called patient and explained to him about biopsy results  Rash doing much better  Two more days of prednisone  Discussed sensitive skin care with him  He has suture removal on 12/28  Informed pt that if rash worsens, he can message us through [employer] or call us  Pt understands  If his rash persists, will consider dupixent  IgE very elevated  Not on keflex  Not using colonge   No new detergents, etc     Thank you

## 2023-01-04 NOTE — TELEPHONE ENCOUNTER
Dr Leah Ho, I called and spoke to patient to schedule him for a follow up  Nothing is available until May for office visit at [facility] or February at [facility]  Patient only wants to go to [place]  I did advise patient that [address] will require a prior authorization and that it can take at least 30 days for a response from the insurance  Patient states his rash is bad and he cannot wait that long and asking if something can be sent in for him now  Patient states he took the 10 days of Prednisone which was helpful and would like another dose of that and possibly something topical as he is almost out of the triamcinolone 0 1% ointment  Do you have any suggestions for where to schedule patient? You do have several procedure spots open on several days?  Thank you

## 2023-01-04 NOTE — TELEPHONE ENCOUNTER
Tanya: can we start prior authorization for this patient for dupixent? Dosing: Start 600mg SC divided in 2 sites x2 and then  300 mg SC q 2 week  His eczema is SEVERE, body surface area is 50%  He is unresponsive to topical steroids (triamcinolone 0 1% ointment) and oral prednisone 50 mg daily  EASI score: 25 2  Thank you! Sherine: Can you schedule follow up visit with me? Thank you!

## 2023-01-05 NOTE — TELEPHONE ENCOUNTER
Spoke with patient and scheduled him for 1/18/23 @ 3205 with Dr Jackelin Liriano  Per Dr Jackelin hernandez to schedule in procedure spot

## 2023-01-10 NOTE — TELEPHONE ENCOUNTER
Called patient but was unable to reach them at this time  I did leave him a voicemail stating we did not have a copy of his pharmacy/ medical insurance on file and was unable to move forward at this time until the information Is provided to us  I did ask him to call the office at his earliest convenience if he has any further questions regarding the information needed  I did also state that we are unable to move forward at this time with authorization process until information is provided

## 2023-01-18 ENCOUNTER — OFFICE VISIT (OUTPATIENT)
Dept: DERMATOLOGY | Facility: CLINIC | Age: 43
End: 2023-01-18

## 2023-01-18 VITALS — HEIGHT: 73 IN | WEIGHT: 261 LBS | BODY MASS INDEX: 34.59 KG/M2 | TEMPERATURE: 97.6 F

## 2023-01-18 DIAGNOSIS — R21 DIFFUSE PAPULAR RASH: ICD-10-CM

## 2023-01-18 DIAGNOSIS — L20.9 ATOPIC DERMATITIS, UNSPECIFIED TYPE: Primary | ICD-10-CM

## 2023-01-18 DIAGNOSIS — L30.1 DYSHIDROTIC ECZEMA: ICD-10-CM

## 2023-01-18 NOTE — PATIENT INSTRUCTIONS
ATOPIC DERMATITIS AND DYSHIDROTIC ECZEMA     Assessment and Plan:  Based on a thorough discussion of this condition and the management approach to it (including a comprehensive discussion of the known risks, side effects and potential benefits of treatment), the patient (family) agrees to implement the following specific plan:  Continue gentle skin cleanser and moisturizer at least twice a day (Anything in pump bottle does not work well   (Use Cream or Ointments which is in jar )   ContinueTriamcinolone 0 1% ointment  twice a day until improved then stop  AVOID FACE AND GROIN   Avoid hot showers  If still no improvement will consider Dupixent injection   Reassured the injection is safer than prednisone   Advised patient to obtain pharmacy card and have any [facility] scan into chart

## 2023-01-18 NOTE — PROGRESS NOTES
[facility]     Patient Name: Rose Marie Carrasco  Encounter Date: 01/18/2023     Have you been cared for by a [place] [address] Dermatologist in the last 3 years and, if so, which description applies to you? Yes  I have been here within the last 3 years, and my medical history has NOT changed since that time  I am MALE/not capable of bearing children  REVIEW OF SYSTEMS:  Have you recently had or currently have any of the following? · No changes in my recent health  PAST MEDICAL HISTORY:  Have you personally ever had or currently have any of the following? If "YES," then please provide more detail  · No changes in my medical history  FAMILY HISTORY:  Any "first degree relatives" (parent, brother, sister, or child) with the following? • No changes in my family's known health  PATIENT EXPERIENCE:    • Do you want the Dermatologist to perform a COMPLETE skin exam today including a clinical examination under the "bra and underwear" areas? NO  • If necessary, do we have your permission to call and leave a detailed message on your Preferred Phone number that includes your specific medical information?   Yes      Allergies   Allergen Reactions   • Cephalexin Rash      Current Outpatient Medications:   •  ibuprofen (MOTRIN) 800 mg tablet, Take 1 tablet (800 mg total) by mouth every 6 (six) hours as needed for mild pain (Patient not taking: Reported on 12/13/2022), Disp: 30 tablet, Rfl: 0  •  methocarbamol (ROBAXIN) 500 mg tablet, Take 1 tablet (500 mg total) by mouth 4 (four) times a day as needed for muscle spasms (Patient not taking: Reported on 12/13/2022), Disp: 20 tablet, Rfl: 0  •  predniSONE 50 mg tablet, Take 1 tablet (50 mg total) by mouth daily, Disp: 10 tablet, Rfl: 0  •  triamcinolone (KENALOG) 0 1 % ointment, Apply topically twice a day for 14 days, Disp: 453 6 g, Rfl: 0          • Whom besides the patient is providing clinical information about today's encounter?   o NO ADDITIONAL HISTORIAN (patient alone provided history)    Physical Exam and Assessment/Plan by Diagnosis:    ATOPIC DERMATITIS AND DYSHIDROTIC ECZEMA  [facility]PROV[facility])    Physical Exam:  • Anatomic Location Affected:  Hands  • Morphological Description:  Rare vesicles and scaly red plaques  • Severity: moderate  • Pertinent Positives:  • Pertinent Negatives: Additional History of Present Condition:  Previous bx done prev visit  Also prescribed course of prednisone and triamcinolone  He states the rash is much improved but still present  No new medications, soaps or shampoos     Assessment and Plan:  Based on a thorough discussion of this condition and the management approach to it (including a comprehensive discussion of the known risks, side effects and potential benefits of treatment), the patient (family) agrees to implement the following specific plan:  • Continue gentle skin cleanser and moisturizer at least twice a day (Anything in pump bottle [lotion] does not work well  (Use Cream or Ointments which is in jar )   • ContinueTriamcinolone 0 1% ointment  twice a day until improved then stop  AVOID FACE AND GROIN  Apply topically twice a day as needed for rash  Avoid face, groin  Do not use for more than 2 weeks at a time as can cause skin thinning  • Avoid hot showers  • If still no improvement will consider Dupixent injection   • Reassured the injection is safer than prednisone   • Advised patient to obtain pharmacy card and have any [facility] scan into chart      Assessment and Plan:   Atopic Dermatitis is a chronic, itchy skin condition that is very common in children but may occur at any age  It is also known as “eczema” or “atopic eczema ” It is the most common form of dermatitis  Atopic dermatitis usually occurs in people who have an “atopic tendency ”  This means they may develop any or all of these closely linked conditions:   Atopic dermatitis, asthma, hay fever (allergic rhinitis), eosinophilic esophagitis, and gastroenteritis  Often these conditions run within families with a parent, child or sibling also affected  A family history of asthma, eczema or hay fever is particularly useful in diagnosing atopic dermatitis in infants  Atopic dermatitis arises because of a complex interaction of genetic and environmental factors  These include defects in skin barrier function making the skin more susceptible to irritation by soap and other contact irritants, the weather, temperature and non-specific triggers  There is also an element of immune system dysregulation that is often present  By definition, it is chronic and has a "waxing-waning" nature; flares should be expected but with good education and treatment strategies can be minimized  Your treatment plan  • Using only mild cleansers (hypoallergenic and without fragrances) and fragrance free detergent (not “unscented” products which contain a masking agent)  • Apply moisturizer to entire body at least 2 times a day  • Use the above listed medication to affected areas twice a day for a full 2 days after the rash has cleared  • Take on over the counter antihistamine like diphenhydramine before bed if the itching is keeping you awake              ATOPIC DERMATITIS FAQS    WHAT IS ATOPIC DERMATITIS? Atopic dermatitis (also called “eczema”) results from a weak skin barrier and an over active immune system  The weak skin barrier allows things to get into the skin and then the immune system has an intense reaction to this substances  The result is itchy red patches anywhere on the body  WHO GETS ATOPIC DERMATITIS? There is no single answer because many factors are involved  It is likely a combination of genetic makeup and environmental triggers and/or exposures  People with atopic dermatitis are prone to other types of "atopy"    They are at increased risk for food allergies, Asthma and hay fever and there is often a family history of these problems as well  WHAT ABOUT FOOD ALLERGIES? This is a very controversial topic, as many believe that food allergies are responsible for skin flares  In some cases, specific foods may cause worsening of atopic dermatitis; however this occurs in a minority of cases and usually happens within a few hours of ingestion  While food allergy is more common in children with eczema, foods are specific triggers for flares in only a small percentage of children  If you notice that the skin flares after certain foods you can see if eliminating one food at time makes a difference, as long as your child can still enjoy a well-balanced diet  There are blood (RAST) and skin (PRICK) tests that can check for allergies, but they are often positive in children who are not truly allergic  Therefore it is important that you work with your allergist and dermatologist to determine which foods are relevant and causing true symptoms  Extreme food elimination diets without the guidance of your doctor, which have become more popular in recent years, may even result in worsening of the skin rash due to malnutrition and avoidance of essential nutrients  WHY SO MUCH MOISTURIZER? This is the 1st step and most important part of treatment  This helps maintaint the skin's barrier function and prevent flares  Creams and ointments are preferable over lotions  Aveeno eczema relief and Eucerin eczema relief  and cerave are all good ones to start with  It is best to put  moisturizer on directly after bathing, while the skin is damp, it is twice as effective then  You may bathe daily  Use warm - not hot - water, for short periods of time (5-10 minutes at a time) Dry off by Lightly patting the skin with a towel and not rubbing  While the skin is still damp, (within 3 minutes) apply any medications to the rashy areas 1st and then moisturize the entire body   It's best to apply the medication 1st but if the medications sting, moisturizer can be applied 1st     WHY USE THE MEDICATION FOR AN EXTRA 2 DAYS AFTER THE RASH IS GONE? Sometimes the rash may appear to be gone, but there are still microscopic changes in the skin  Using the medication and extra 2 days will ensure the inflammation has resolve and make the rash less likely to return quickly  WHAT ARE TRIGGERS?  • Occasionally there are specific things that trigger itching and rashes, but in many cases may none can be identified  The most common triggers are:  • Heat and sweat for some individuals, cold weather for others  • House dust mites, pet fur  • Wool; synthetic fabrics like nylon; dyed fabrics  • Tobacco smoke   • Fragrances in: shampoos, soaps, lotions, laundry detergents, fabric softeners  • Saliva or prolonged exposure to water  start with these  Avoid the use of fabric softeners in the washing machine or dryer sheets (unless they are fragrance-free)  Try to use laundry detergents, soaps and shampoos that are fragrance-free  You may find it helpful to double-rinse your clothes  Some children are sensitive to house dust mites and they may benefit from a plastic mattress wrap  While food allergy is more common in children with eczema, foods are specific triggers for flares in only a small percentage of children  If you notice that the skin flares after certain foods you can see if eliminating one food at time makes a difference, as long as your child can still enjoy a well-balanced diet  4) WHAT DOES THE ANTIHISTAMINE DO? Antihistamines help you not to scratch  Scratching only makes the skin more reactive and the barrier function even more disrupted  It can cause both children and their parents to lose sleep! There are different types of anti-itch medications  Some cause more drowsiness than others  Both types are acceptable depending on your child and your preference    Start with Benadryl and if that does not work, ask for a prescription “antihistamine ”    5) ARE THERE ANY SIDE EFFECTS TO WORRY ABOUT? Corticosteroid creams and ointments (generally things with "-one" or "keren" on the end of their names): The strength of the cream or ointment depends on the name of the active ingredient  The numbers at the end do not indicate the relative strength  Thus triamcinolone 0 1% ointment, considered a mid-strength corticosteroid, is much stronger than hydrocortisone 1% even though the number following the name is much lower  Topical corticosteroids are very effective in treating atopic dermatitis  When used in the manner prescribed (to rashy areas of skin and for no more than a few weeks at a time to any one area) they are very safe  These are corticosteroids and are anti-inflammatory, not the “anabolic steroids” like those used illegally by some athletes  It is important that you do not overuse steroid creams, and if you notice a thin, shiny appearance to the skin or broken blood vessels, you should stop using the cream and consult your health care provider regarding possible overuse/overthinning of the skin  The face, armpits and groin have particularly thin and sensitive skin and are therefore most at risk for bad results if steroids are over-used in these sites  Topical non-steroid creams and ointments (immunomodulators): These creams and ointments are also called topical calcineurin inhibitors (TCIs)  These include Protopic ointment and Elidel cream  Crisaborole 2% Gloria [place]) is a prescription ointment that targets an enzyme called PDE4 (phosphodiesterase 4)  It is used on the skin topically to treat mild-to-moderate eczema in adults and children 3years of age and older  In total, these nonsteroidal prescriptions are used to help decrease itching and redness in the skin  They are not as strong as most steroid creams; however, it is believed that they do not thin the skin when overused    They are generally used as second-line medications, though they may be used alone or in conjunction with topical steroids  In sensitive areas such as the face, underarms or groin, they are often recommended  They can sting inflamed skin, but are generally well tolerated once the skin is healing  The FDA placed a “black-box” warning on both Elidel and Protopic in 2006 based on animal studies using the medications  Some animals developed skin cancer and lymphoma  Subsequently, the FDA released a statement that there is no causal relationship between the two medications and cancer  Because of this concern, there are ongoing studies to evaluate this relationship in humans  So far, there are studies that support the safety of these medications  One showed that the rates of cancer in patient using these medications topically were less than the rates of the general population and another showed that in patient's using the medication over a large area of the body, the levels of the medication in the blood was undetectable  As for Eucrisa, this product is only approved for the topical treatment of mild-to-moderate eczema in patients 3years of age and older; use of the medication in kids younger than 2 is considered “off label” and has not been formally studied  Burning and stinging are the most commonly reported side effects of this medication  Rarely, this product has been known to cause hives and hypersensitivity reactions; discontinue its use if you develop severe itching, swelling, or redness in the area of application      Scribe Attestation    I,:  Claudean Limber am acting as a scribe while in the presence of the attending physician :       I,:  Arlester Romberg, MD personally performed the services described in this documentation    as scribed in my presence :

## 2023-06-09 ENCOUNTER — PATIENT MESSAGE (OUTPATIENT)
Dept: DERMATOLOGY | Facility: CLINIC | Age: 43
End: 2023-06-09

## 2023-06-09 DIAGNOSIS — R21 DIFFUSE PAPULAR RASH: ICD-10-CM

## 2023-06-14 RX ORDER — PREDNISONE 50 MG/1
50 TABLET ORAL DAILY
Qty: 10 TABLET | Refills: 0 | Status: SHIPPED | OUTPATIENT
Start: 2023-06-14

## 2023-07-07 ENCOUNTER — TELEPHONE (OUTPATIENT)
Dept: OTHER | Facility: OTHER | Age: 43
End: 2023-07-07

## 2023-07-10 NOTE — TELEPHONE ENCOUNTER
Called and left message for patient. Needs [facility] in [facility]. Nothing available at this time. Informed patient that I would contact him as soon as there was availability - either with Dr. Ophelia Rangel with any provider in [facility] location.

## 2023-07-26 ENCOUNTER — OFFICE VISIT (OUTPATIENT)
Age: 43
End: 2023-07-26
Payer: COMMERCIAL

## 2023-07-26 VITALS — HEIGHT: 73 IN | WEIGHT: 261 LBS | BODY MASS INDEX: 34.59 KG/M2

## 2023-07-26 DIAGNOSIS — R21 DIFFUSE PAPULAR RASH: ICD-10-CM

## 2023-07-26 DIAGNOSIS — L20.9 ATOPIC DERMATITIS, UNSPECIFIED TYPE: Primary | ICD-10-CM

## 2023-07-26 PROCEDURE — 11900 INJECT SKIN LESIONS </W 7: CPT | Performed by: DERMATOLOGY

## 2023-07-26 PROCEDURE — 99214 OFFICE O/P EST MOD 30 MIN: CPT | Performed by: DERMATOLOGY

## 2023-07-26 RX ORDER — TRIAMCINOLONE ACETONIDE 40 MG/ML
40 INJECTION, SUSPENSION INTRA-ARTICULAR; INTRAMUSCULAR ONCE
Status: COMPLETED | OUTPATIENT
Start: 2023-07-26 | End: 2023-07-26

## 2023-07-26 RX ADMIN — TRIAMCINOLONE ACETONIDE 40 MG: 40 INJECTION, SUSPENSION INTRA-ARTICULAR; INTRAMUSCULAR at 14:22

## 2023-07-26 NOTE — PROGRESS NOTES
Scribe Attestation    I,:  Hattie Gleason am acting as a scribe while in the presence of the attending physician.:       I,:  Ovidio Sutton MD personally performed the services described in this documentation    as scribed in my presence.:       [facility] Note     Patient Name: Leticia Tyler  Encounter Date: July 24, 2023     Have you been cared for by a [facility] Dermatologist in the last 3 years and, if so, which description applies to you? Yes. I have been here within the last 3 years, and my medical history has NOT changed since that time. I am MALE/not capable of bearing children. REVIEW OF SYSTEMS:  Have you recently had or currently have any of the following? · No changes in my recent health. PAST MEDICAL HISTORY:  Have you personally ever had or currently have any of the following? If "YES," then please provide more detail. · No changes in my medical history. FAMILY HISTORY:  Any "first degree relatives" (parent, brother, sister, or child) with the following? • No changes in my family's known health. PATIENT EXPERIENCE:    • Do you want the Dermatologist to perform a COMPLETE skin exam today including a clinical examination under the "bra and underwear" areas? Yes  • If necessary, do we have your permission to call and leave a detailed message on your Preferred Phone number that includes your specific medical information? Yes      Allergies   Allergen Reactions   • Cephalexin Rash      Current Outpatient Medications:   •  triamcinolone (KENALOG) 0.1 % ointment, Apply topically twice a day as needed for rash. Avoid face, groin.  Do not use for more than 2 weeks at a time as can cause skin thinning, Disp: 453.6 g, Rfl: 0  •  ibuprofen (MOTRIN) 800 mg tablet, Take 1 tablet (800 mg total) by mouth every 6 (six) hours as needed for mild pain (Patient not taking: Reported on 12/13/2022), Disp: 30 tablet, Rfl: 0  •  methocarbamol (ROBAXIN) 500 mg tablet, Take 1 tablet (500 mg total) by mouth 4 (four) times a day as needed for muscle spasms (Patient not taking: Reported on 12/13/2022), Disp: 20 tablet, Rfl: 0  •  predniSONE 50 mg tablet, Take 1 tablet (50 mg total) by mouth daily (Patient not taking: Reported on 7/26/2023), Disp: 10 tablet, Rfl: 0          • Whom besides the patient is providing clinical information about today's encounter?   o NO ADDITIONAL HISTORIAN (patient alone provided history)    Physical Exam and Assessment/Plan by Diagnosis:    Chief complaint: Pt is a 42 y/o male present for persistent rash that started last year after a bad sun burn infection while he was in [place]. Rash has been present since taking Keflex for the infections. Pt was seen by Dr. Audie Simmons in Jan 2023 when a biopsy was done, pt was given Prednisone course and Triamcinolone Cream which he is still using. 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 Pt states the rash is still bumpy and itchy on the trunk and extremities. Patient has a prior authorization pending for Dupixient    ATOPIC DERMATITIS     Physical Exam:  • Anatomic Location Affected:  Trunk and extremities  • Morphological Description: scaling, xerotic nummular patches mostly on the torso and arms  • Body Surface Area Today: 40  • Overall Severity: moderate  • Pertinent Positives:  • Pertinent Negatives:Patient has been using topicals once a  Day using [place] Soap    Additional History of Present Condition:  Present since last year, currently using Triamcinolone Cream once a day Patient not aware of the concept of Dupixient as a chronic treatment    Assessment and Plan:  Based on a thorough discussion of this condition and the management approach to it (including a comprehensive discussion of the known risks, side effects and potential benefits of treatment), the patient (family) agrees to implement the following specific plan:  • Stop [place] soap, switch to Roya Skin Soap  • Continue with Care-Ve Cream daily  • Increase Triamcinolone ointment to Twice a day until resolved  • Understanding of the purpose of Dupixent as a chronic long term treatment will reevaluate in 6 weeks to consider therapy     Assessment and Plan:   · Kenalog Injection done in office  PROCEDURE:  INTRALESIONAL STEROID INJECTION (KENALOG INJECTION)    Purpose: Triamcinolone is a synthetic glucocorticoid corticosteroid that has marked anti-inflammatory action. It is prepared in sterile aqueous suspension suitable for injecting directly into a lesion on or immediately below the skin to treat a dermal inflammatory process. Indications: It is indicated for alopecia areata; inflammatory acne cysts; discoid lupus erythematosus; keloids and hypertrophic scars; inflammatory lesions of granuloma annulare, lichen planus, lichen simplex chronicus (neurodermatitis), psoriatic plaques, and other localized inflammatory skin conditions. Potential Side Effects: I understand that triamcinolone injection can potentially cause early and/or delayed adverse effects such as:   • Pain   • Impaired wound healing   • Increased hair growth   • Bleeding   • White or brown marks   • Steroid acne   • Infection   • Telangiectasia   • Skin thinning   • Cutaneous and subcutaneous lipoatrophy (most common) appearing as skin indentations or dimples around the injection sites a few weeks after treatment     PROCEDURE NOTE:  After verbal and written consent were obtained, the to-be-treated area was wiped and cleaned with rubbing alcohol 70%. Then, a total of 1 mL of Kenalog CONCENTRATION:  40 mg/mL   (Lot# [id number]; Expiration 02/28/2024, NDC#: 4306-7107-97) was injected intramuscularly into the following anatomic area:  Right Buttock using a 3-mL syringe and a 22-gauge needle. There was less than 1 mL of blood loss and little to no discomfort. The area was bandaged with a Band-aid.   The patient tolerated the procedure well and remained in the office for observation. With no signs of an adverse reaction, the patient was eventually discharged from clinic. Atopic Dermatitis is a chronic, itchy skin condition that is very common in children but may occur at any age. It is also known as “eczema” or “atopic eczema.” It is the most common form of dermatitis. Atopic dermatitis usually occurs in people who have an “atopic tendency.”  This means they may develop any or all of these closely linked conditions: Atopic dermatitis, asthma, hay fever (allergic rhinitis), eosinophilic esophagitis, and gastroenteritis. Often these conditions run within families with a parent, child or sibling also affected. A family history of asthma, eczema or hay fever is particularly useful in diagnosing atopic dermatitis in infants. Atopic dermatitis arises because of a complex interaction of genetic and environmental factors. These include defects in skin barrier function making the skin more susceptible to irritation by soap and other contact irritants, the weather, temperature and non-specific triggers. There is also an element of immune system dysregulation that is often present. By definition, it is chronic and has a "waxing-waning" nature; flares should be expected but with good education and treatment strategies can be minimized. Some specific tips we discussed:  • Dry skin care. • Using only mild cleansers (hypoallergenic and without fragrances) and fragrance free detergent (not “unscented” products which contain a masking agent); we discussed avoiding irritants/fragranced products.   • The importance of regular application of moisturizers daily (at least 3 times a day)  • The known and theoretical side effects of steroids at length, including but not limited to atrophy of skin and increased pressure in eye (glaucoma) and clouding of the eye's lens (cataracts) if used in or around the eye for extended durations. • The specific over-the-counter interventions and medications. • Side effects, risks and benefits of topical and oral medications discussed. • After lengthy discussion of etiology and treatment options, we decided to implement the following personalized treatment plan:      EDUCATION AS INTERVENTION! WHAT IS ATOPIC DERMATITIS? Atopic dermatitis (also called “eczema”) is a condition of the skin where the skin is dry, red, and itchy. The main function of the skin is to provide a barrier from the environment and is also the first defense of the immune system. In atopic dermatitis the skin barrier is decreased or disrupted, and the skin is easily irritated. As a result, moisture escapes the skin more easily, and environmental allergens and microbes can enter the skin more easily. Consequently, the skin's immune system is altered. If there are increased allergic type cells in the skin, the skin may become red and “hyper-excitable.” This leads to itching and a subsequent rash. WHY DO PEOPLE GET ATOPIC DERMATITIS? There is no single answer because many factors are involved. It is likely a combination of genetic makeup and environmental triggers and/or exposures. Excessive drying or sweating of the skin, Irritating soaps, dust mites, and pet dander are some of the more common triggers. There is no blood test that can be done to confirm this diagnosis. The history and appearance of the skin is usually sufficient for a diagnosis. However, in some cases if the rash does not fit with the history or respond appropriately to treatment, a skin biopsy may be helpful. Many children do outgrow atopic dermatitis or get better; however, many continue to have sensitive skin into adulthood. Asthma and hay fever are often seen in many patients with atopic dermatitis; however, asthma flares do not necessarily occur at the same time as skin flares.      PREVENTING FLARES OF ATOPIC DERMATITIS  The first step is to maintain the skin's barrier function. Keep the skin well moisturized. Avoid irritants and triggers. Use prescribed medicine when there are red or rough areas to help the skin to return to normal as quickly as possible. Try to limit scratching. If you keep the skin well moisturized, and avoid coming in contact with things you know irritate your child's skin, there will be less flares. However, some flares of atopic dermatitis are beyond your control. You should work with your health care provider to come up with a plan that minimizes flares while minimizing long term use of medications that suppress the immune system. WHAT ARE SOME OF THE TRIGGERS? Triggers are different for different people. The most common triggers are:  • Heat and sweat for some individuals, cold weather for others. • House dust mites, pet fur. • Wool; synthetic fabrics like nylon; dyed fabrics. • Tobacco smoke   • Fragrances in: shampoos, soaps, lotions, laundry detergents, fabric softeners. • Saliva or prolonged exposure to water. WHAT ABOUT FOOD ALLERGIES? This is a very controversial topic, as many believe that food allergies are responsible for skin flares. In some cases, specific foods may cause worsening of atopic dermatitis; however this occurs in a minority of cases and usually happens within a few hours of ingestion. While food allergy is more common in children with eczema, foods are specific triggers for flares in only a small percentage of children. If you notice that the skin flares after certain foods you can see if eliminating one food at time makes a difference, as long as your child can still enjoy a well-balanced diet. There are blood (RAST) and skin (PRICK) tests that can check for allergies, but they are often positive in children who are not truly allergic.  Therefore it is important that you work with your allergist and dermatologist to determine which foods are relevant and causing true symptoms. Extreme food elimination diets without the guidance of your doctor, which have become more popular in recent years, may even result in worsening of the skin rash due to malnutrition and avoidance of essential nutrients. TREATMENT  Treatments are aimed at minimizing exposure to irritating factors and decreasing  the skin inflammation which results in an itchy rash. There are many different treatment options, which depend on your child's rash, its location, and severity. Topical treatments include corticosteroids and steroid-like creams such as Protopic, Elidel, and Eucrisa, which are believed to not thin the skin. Please read the discussions below regarding risks and benefits of all of these creams. Occasionally bacterial or viral infections can occur which flare the skin and require oral and/or topical antibiotics or antivirals. In some cases bleach baths 2-3 times weekly can be helpful to prevent recurrent infection. For severe disease, strong oral medications such as corticosteroids, methotrexate or azathioprine (Imuran) may be needed. These medications require close monitoring and follow-up. You should discuss the risks/ benefits/alternatives of these medications with your health care provider to come up with the best treatment plan for your child. 1) Use moisturizer all over the entire body at least THREE TIMES a day. This keeps the skin moisturized to restore the barrier function. Find a cream or ointment that your child likes - this is the most important. The medicines do not work in the bottle. The thicker the moisturizer, generally the better barrier it provides. Ointments often moisturize better than creams; and creams work better than lotions. Lotions are more useful during the summer when thick greasy ointments are uncomfortable. If you put moisturizer on the skin after bathing, while the skin is damp, it is twice as effective.   The moisturizer provides a seal holding the water in the skin. You may bathe your child in warm - not hot - water, for short periods of time (no more than 5-10 minutes at a time) once a day if they like. Lightly pat your child dry with a towel and, while the skin is still damp, (within 3 minutes) apply a moisturizer from head to toe. If your child is using a medicated cream, apply it and allow it to absorb completely BEFORE you apply the moisturizer. 2) Apply the prescription medication TWICE A DAY to only the red, [place] [address] [address]  Put the medication on your fingers and gently rub it into the areas. Usually the medicine will help an area within a few days time. Try to put the medicine on for two days after you have noticed that the redness is no longer present; this will help the redness from returning. The severity of the rash and the strength and usage of the medication will determine how quickly you see improvement. It is important that you do not overuse steroid creams, and if you notice a thin, shiny appearance to the skin or broken blood vessels, you should stop using the cream and consult your health care provider regarding possible overuse/overthinning of the skin. The face, armpits and groin have particularly thin and sensitive skin and are therefore most at risk for bad results if steroids are over-used in these sites. 3) Avoid triggers. Some children have specific things that trigger itching and rashes, while others may have none that can be identified. It may require a little bit of trial and error to see what applies to your child. Also, triggers can change over time for your child. The most common triggers are listed above; start with these. Avoid the use of fabric softeners in the washing machine or dryer sheets (unless they are fragrance-free). Try to use laundry detergents, soaps and shampoos that are fragrance-free.  You may find it helpful to double-rinse your clothes. Some children are sensitive to house dust mites and they may benefit from a plastic mattress wrap. While food allergy is more common in children with eczema, foods are specific triggers for flares in only a small percentage of children. If you notice that the skin flares after certain foods you can see if eliminating one food at time makes a difference, as long as your child can still enjoy a well-balanced diet. 4) Consider using a medication like an anti-histamine by mouth to help control the itching. Scratching only makes the skin more reactive and the barrier function even more disrupted. It can cause both children and their parents to lose sleep! There are different types of anti-itch medications. Some cause more drowsiness than others. Both types are acceptable depending on your child and your preference. Start with Benadryl and if that does not work, ask for a prescription “antihistamine.”    5) About the prescription creams:  Corticosteroid creams and ointments (generally things with "-one" or "keren" on the end of their names): The strength of the cream or ointment depends on the name of the active ingredient. The numbers at the end do not indicate the relative strength. Thus triamcinolone 0.1% ointment, considered a mid-strength corticosteroid, is much stronger than hydrocortisone 1% even though the number following the name is much lower. Topical corticosteroids are very effective in treating atopic dermatitis. When used in the manner prescribed (to rashy areas of skin and for no more than a few weeks at a time to any one area) they are very safe. These are corticosteroids and are anti-inflammatory, not the “anabolic steroids” like those used illegally by some athletes. Topical non-steroid creams and ointments (immunomodulators): These creams and ointments are also called topical calcineurin inhibitors (TCIs).   These include Protopic ointment and Elidel cream. Crisaborole 2% Joseph Chargregory) is a prescription ointment that targets an enzyme called PDE4 (phosphodiesterase 4). It is used on the skin topically to treat mild-to-moderate eczema in adults and children 3years of age and older. In total, these nonsteroidal prescriptions are used to help decrease itching and redness in the skin. They are not as strong as most steroid creams; however, it is believed that they do not thin the skin when overused. They are generally used as second-line medications, though they may be used alone or in conjunction with topical steroids. In sensitive areas such as the face, underarms or groin, they are often recommended. They can sting inflamed skin, but are generally well tolerated once the skin is healing. The FDA placed a “black-box” warning on both Elidel and Protopic in 2006 based on animal studies using the medications. Some animals developed skin cancer and lymphoma. Subsequently, the FDA released a statement that there is no causal relationship between the two medications and cancer. Because of this concern, there are ongoing studies to evaluate this relationship in humans. So far, there are studies that support the safety of these medications. One showed that the rates of cancer in patient using these medications topically were less than the rates of the general population and another showed that in patient's using the medication over a large area of the body, the levels of the medication in the blood was undetectable. As for Eucrisa, this product is only approved for the topical treatment of mild-to-moderate eczema in patients 3years of age and older; use of the medication in kids younger than 2 is considered “off label” and has not been formally studied. Burning and stinging are the most commonly reported side effects of this medication.   Rarely, this product has been known to cause hives and hypersensitivity reactions; discontinue its use if you develop severe itching, swelling, or redness in the area of application.     Scribe Attestation    I,:   am acting as a scribe while in the presence of the attending physician.:       I,:   personally performed the services described in this documentation    as scribed in my presence.:

## 2023-07-26 NOTE — PATIENT INSTRUCTIONS
ATOPIC DERMATITIS     Assessment and Plan:  Based on a thorough discussion of this condition and the management approach to it (including a comprehensive discussion of the known risks, side effects and potential benefits of treatment), the patient (family) agrees to implement the following specific plan:  Stop [place] soap, switch to [employer]  Continue with Care-Ve Cream daily  Increase Triamcinolone Cream to Twice a day until resolved  To consider Dupixent Injection     Assessment and Plan:   Atopic Dermatitis is a chronic, itchy skin condition that is very common in children but may occur at any age. It is also known as “eczema” or “atopic eczema.” It is the most common form of dermatitis. Atopic dermatitis usually occurs in people who have an “atopic tendency.”  This means they may develop any or all of these closely linked conditions: Atopic dermatitis, asthma, hay fever (allergic rhinitis), eosinophilic esophagitis, and gastroenteritis. Often these conditions run within families with a parent, child or sibling also affected. A family history of asthma, eczema or hay fever is particularly useful in diagnosing atopic dermatitis in infants. Atopic dermatitis arises because of a complex interaction of genetic and environmental factors. These include defects in skin barrier function making the skin more susceptible to irritation by soap and other contact irritants, the weather, temperature and non-specific triggers. There is also an element of immune system dysregulation that is often present. By definition, it is chronic and has a "waxing-waning" nature; flares should be expected but with good education and treatment strategies can be minimized. Some specific tips we discussed:  Dry skin care.   Using only mild cleansers (hypoallergenic and without fragrances) and fragrance free detergent (not “unscented” products which contain a masking agent); we discussed avoiding irritants/fragranced products. The importance of regular application of moisturizers daily (at least 3 times a day)  The known and theoretical side effects of steroids at length, including but not limited to atrophy of skin and increased pressure in eye (glaucoma) and clouding of the eye's lens (cataracts) if used in or around the eye for extended durations. The specific over-the-counter interventions and medications. Side effects, risks and benefits of topical and oral medications discussed. After lengthy discussion of etiology and treatment options, we decided to implement the following personalized treatment plan:      EDUCATION AS INTERVENTION! WHAT IS ATOPIC DERMATITIS? Atopic dermatitis (also called “eczema”) is a condition of the skin where the skin is dry, red, and itchy. The main function of the skin is to provide a barrier from the environment and is also the first defense of the immune system. In atopic dermatitis the skin barrier is decreased or disrupted, and the skin is easily irritated. As a result, moisture escapes the skin more easily, and environmental allergens and microbes can enter the skin more easily. Consequently, the skin's immune system is altered. If there are increased allergic type cells in the skin, the skin may become red and “hyper-excitable.” This leads to itching and a subsequent rash. WHY DO PEOPLE GET ATOPIC DERMATITIS? There is no single answer because many factors are involved. It is likely a combination of genetic makeup and environmental triggers and/or exposures. Excessive drying or sweating of the skin, Irritating soaps, dust mites, and pet dander are some of the more common triggers. There is no blood test that can be done to confirm this diagnosis. The history and appearance of the skin is usually sufficient for a diagnosis.  However, in some cases if the rash does not fit with the history or respond appropriately to treatment, a skin biopsy may be helpful. Many children do outgrow atopic dermatitis or get better; however, many continue to have sensitive skin into adulthood. Asthma and hay fever are often seen in many patients with atopic dermatitis; however, asthma flares do not necessarily occur at the same time as skin flares. PREVENTING FLARES OF ATOPIC DERMATITIS  The first step is to maintain the skin's barrier function. Keep the skin well moisturized. Avoid irritants and triggers. Use prescribed medicine when there are red or rough areas to help the skin to return to normal as quickly as possible. Try to limit scratching. If you keep the skin well moisturized, and avoid coming in contact with things you know irritate your child's skin, there will be less flares. However, some flares of atopic dermatitis are beyond your control. You should work with your health care provider to come up with a plan that minimizes flares while minimizing long term use of medications that suppress the immune system. WHAT ARE SOME OF THE TRIGGERS? Triggers are different for different people. The most common triggers are:  Heat and sweat for some individuals, cold weather for others. House dust mites, pet fur. Wool; synthetic fabrics like nylon; dyed fabrics. Tobacco smoke   Fragrances in: shampoos, soaps, lotions, laundry detergents, fabric softeners. Saliva or prolonged exposure to water. WHAT ABOUT FOOD ALLERGIES? This is a very controversial topic, as many believe that food allergies are responsible for skin flares. In some cases, specific foods may cause worsening of atopic dermatitis; however this occurs in a minority of cases and usually happens within a few hours of ingestion. While food allergy is more common in children with eczema, foods are specific triggers for flares in only a small percentage of children.   If you notice that the skin flares after certain foods you can see if eliminating one food at time makes a difference, as long as your child can still enjoy a well-balanced diet. There are blood (RAST) and skin (PRICK) tests that can check for allergies, but they are often positive in children who are not truly allergic. Therefore it is important that you work with your allergist and dermatologist to determine which foods are relevant and causing true symptoms. Extreme food elimination diets without the guidance of your doctor, which have become more popular in recent years, may even result in worsening of the skin rash due to malnutrition and avoidance of essential nutrients. TREATMENT  Treatments are aimed at minimizing exposure to irritating factors and decreasing  the skin inflammation which results in an itchy rash. There are many different treatment options, which depend on your child's rash, its location, and severity. Topical treatments include corticosteroids and steroid-like creams such as Protopic, Elidel, and Eucrisa, which are believed to not thin the skin. Please read the discussions below regarding risks and benefits of all of these creams. Occasionally bacterial or viral infections can occur which flare the skin and require oral and/or topical antibiotics or antivirals. In some cases bleach baths 2-3 times weekly can be helpful to prevent recurrent infection. For severe disease, strong oral medications such as corticosteroids, methotrexate or azathioprine (Imuran) may be needed. These medications require close monitoring and follow-up. You should discuss the risks/ benefits/alternatives of these medications with your health care provider to come up with the best treatment plan for your child. 1) Use moisturizer all over the entire body at least THREE TIMES a day. This keeps the skin moisturized to restore the barrier function. Find a cream or ointment that your child likes - this is the most important. The medicines do not work in the bottle.   The thicker the moisturizer, generally the better barrier it provides. Ointments often moisturize better than creams; and creams work better than lotions. Lotions are more useful during the summer when thick greasy ointments are uncomfortable. If you put moisturizer on the skin after bathing, while the skin is damp, it is twice as effective. The moisturizer provides a seal holding the water in the skin. You may bathe your child in warm - not hot - water, for short periods of time (no more than 5-10 minutes at a time) once a day if they like. Lightly pat your child dry with a towel and, while the skin is still damp, (within 3 minutes) apply a moisturizer from head to toe. If your child is using a medicated cream, apply it and allow it to absorb completely BEFORE you apply the moisturizer. 2) Apply the prescription medication TWICE A DAY to only the red, [place] [address] [address]  Put the medication on your fingers and gently rub it into the areas. Usually the medicine will help an area within a few days time. Try to put the medicine on for two days after you have noticed that the redness is no longer present; this will help the redness from returning. The severity of the rash and the strength and usage of the medication will determine how quickly you see improvement. It is important that you do not overuse steroid creams, and if you notice a thin, shiny appearance to the skin or broken blood vessels, you should stop using the cream and consult your health care provider regarding possible overuse/overthinning of the skin. The face, armpits and groin have particularly thin and sensitive skin and are therefore most at risk for bad results if steroids are over-used in these sites. 3) Avoid triggers. Some children have specific things that trigger itching and rashes, while others may have none that can be identified. It may require a little bit of trial and error to see what applies to your child.   Also, triggers can change over time for your child. The most common triggers are listed above; start with these. Avoid the use of fabric softeners in the washing machine or dryer sheets (unless they are fragrance-free). Try to use laundry detergents, soaps and shampoos that are fragrance-free. You may find it helpful to double-rinse your clothes. Some children are sensitive to house dust mites and they may benefit from a plastic mattress wrap. While food allergy is more common in children with eczema, foods are specific triggers for flares in only a small percentage of children. If you notice that the skin flares after certain foods you can see if eliminating one food at time makes a difference, as long as your child can still enjoy a well-balanced diet. 4) Consider using a medication like an anti-histamine by mouth to help control the itching. Scratching only makes the skin more reactive and the barrier function even more disrupted. It can cause both children and their parents to lose sleep! There are different types of anti-itch medications. Some cause more drowsiness than others. Both types are acceptable depending on your child and your preference. Start with Benadryl and if that does not work, ask for a prescription “antihistamine.”    5) About the prescription creams:  Corticosteroid creams and ointments (generally things with "-one" or "keren" on the end of their names): The strength of the cream or ointment depends on the name of the active ingredient. The numbers at the end do not indicate the relative strength. Thus triamcinolone 0.1% ointment, considered a mid-strength corticosteroid, is much stronger than hydrocortisone 1% even though the number following the name is much lower. Topical corticosteroids are very effective in treating atopic dermatitis. When used in the manner prescribed (to rashy areas of skin and for no more than a few weeks at a time to any one area) they are very safe. These are corticosteroids and are anti-inflammatory, not the “anabolic steroids” like those used illegally by some athletes. Topical non-steroid creams and ointments (immunomodulators): These creams and ointments are also called topical calcineurin inhibitors (TCIs). These include Protopic ointment and Elidel cream. Crisaborole 2% Etha Snowden) is a prescription ointment that targets an enzyme called PDE4 (phosphodiesterase 4). It is used on the skin topically to treat mild-to-moderate eczema in adults and children 3years of age and older. In total, these nonsteroidal prescriptions are used to help decrease itching and redness in the skin. They are not as strong as most steroid creams; however, it is believed that they do not thin the skin when overused. They are generally used as second-line medications, though they may be used alone or in conjunction with topical steroids. In sensitive areas such as the face, underarms or groin, they are often recommended. They can sting inflamed skin, but are generally well tolerated once the skin is healing. The FDA placed a “black-box” warning on both Elidel and Protopic in 2006 based on animal studies using the medications. Some animals developed skin cancer and lymphoma. Subsequently, the FDA released a statement that there is no causal relationship between the two medications and cancer. Because of this concern, there are ongoing studies to evaluate this relationship in humans. So far, there are studies that support the safety of these medications. One showed that the rates of cancer in patient using these medications topically were less than the rates of the general population and another showed that in patient's using the medication over a large area of the body, the levels of the medication in the blood was undetectable.     As for Eucrisa, this product is only approved for the topical treatment of mild-to-moderate eczema in patients 3years of age and older; use of the medication in kids younger than 2 is considered “off label” and has not been formally studied. Burning and stinging are the most commonly reported side effects of this medication. Rarely, this product has been known to cause hives and hypersensitivity reactions; discontinue its use if you develop severe itching, swelling, or redness in the area of application.

## 2023-09-28 ENCOUNTER — OFFICE VISIT (OUTPATIENT)
Age: 43
End: 2023-09-28
Payer: COMMERCIAL

## 2023-09-28 VITALS — HEIGHT: 73 IN | WEIGHT: 275 LBS | BODY MASS INDEX: 36.45 KG/M2

## 2023-09-28 DIAGNOSIS — L20.9 ATOPIC DERMATITIS, UNSPECIFIED TYPE: ICD-10-CM

## 2023-09-28 DIAGNOSIS — Z13.89 SCREENING FOR SKIN CONDITION: Primary | ICD-10-CM

## 2023-09-28 PROCEDURE — 99214 OFFICE O/P EST MOD 30 MIN: CPT | Performed by: DERMATOLOGY

## 2023-09-28 NOTE — PATIENT INSTRUCTIONS
ATOPIC DERMATITIS     Assessment and Plan:  Based on a thorough discussion of this condition and the management approach to it (including a comprehensive discussion of the known risks, side effects and potential benefits of treatment), the patient (family) agrees to implement the following specific plan:  Continue with Care-Ve Cream daily and Dove for sensitive skin. Continue with Triamcinolone ointment to Twice a day until resolved  Understanding of the purpose of Dupixent as a chronic long term treatment. We will start prior authorization process for Dupixent. Enrollment form signed by patient today. Assessment and Plan:   Atopic Dermatitis is a chronic, itchy skin condition that is very common in children but may occur at any age. It is also known as “eczema” or “atopic eczema.” It is the most common form of dermatitis. Atopic dermatitis usually occurs in people who have an “atopic tendency.”  This means they may develop any or all of these closely linked conditions: Atopic dermatitis, asthma, hay fever (allergic rhinitis), eosinophilic esophagitis, and gastroenteritis. Often these conditions run within families with a parent, child or sibling also affected. A family history of asthma, eczema or hay fever is particularly useful in diagnosing atopic dermatitis in infants. Atopic dermatitis arises because of a complex interaction of genetic and environmental factors. These include defects in skin barrier function making the skin more susceptible to irritation by soap and other contact irritants, the weather, temperature and non-specific triggers. There is also an element of immune system dysregulation that is often present. By definition, it is chronic and has a "waxing-waning" nature; flares should be expected but with good education and treatment strategies can be minimized.     Your treatment plan  Using only mild cleansers (hypoallergenic and without fragrances) and fragrance free detergent (not “unscented” products which contain a masking agent)  Apply moisturizer to entire body at least 2 times a day  Use the above listed medication to affected areas twice a day for a full 2 days after the rash has cleared  Take on over the counter antihistamine like diphenhydramine before bed if the itching is keeping you awake              ATOPIC DERMATITIS FAQS    WHAT IS ATOPIC DERMATITIS? Atopic dermatitis (also called “eczema”) results from a weak skin barrier and an over active immune system. The weak skin barrier allows things to get into the skin and then the immune system has an intense reaction to this substances. The result is itchy red patches anywhere on the body. WHO GETS ATOPIC DERMATITIS? There is no single answer because many factors are involved. It is likely a combination of genetic makeup and environmental triggers and/or exposures. People with atopic dermatitis are prone to other types of "atopy". They are at increased risk for food allergies, Asthma and hay fever and there is often a family history of these problems as well. WHAT ABOUT FOOD ALLERGIES? This is a very controversial topic, as many believe that food allergies are responsible for skin flares. In some cases, specific foods may cause worsening of atopic dermatitis; however this occurs in a minority of cases and usually happens within a few hours of ingestion. While food allergy is more common in children with eczema, foods are specific triggers for flares in only a small percentage of children. If you notice that the skin flares after certain foods you can see if eliminating one food at time makes a difference, as long as your child can still enjoy a well-balanced diet. There are blood (RAST) and skin (PRICK) tests that can check for allergies, but they are often positive in children who are not truly allergic.  Therefore it is important that you work with your allergist and dermatologist to determine which foods are relevant and causing true symptoms. Extreme food elimination diets without the guidance of your doctor, which have become more popular in recent years, may even result in worsening of the skin rash due to malnutrition and avoidance of essential nutrients. WHY SO MUCH MOISTURIZER? This is the 1st step and most important part of treatment. This helps maintaint the skin's barrier function and prevent flares. Creams and ointments are preferable over lotions. Aveeno eczema relief and Eucerin eczema relief  and cerave are all good ones to start with. It is best to put  moisturizer on directly after bathing, while the skin is damp, it is twice as effective then. You may bathe daily. Use warm - not hot - water, for short periods of time (5-10 minutes at a time) Dry off by Lightly patting the skin with a towel and not rubbing. While the skin is still damp, (within 3 minutes) apply any medications to the rashy areas 1st and then moisturize the entire body. It's best to apply the medication 1st but if the medications sting, moisturizer can be applied 1st.    WHY USE THE MEDICATION FOR AN EXTRA 2 DAYS AFTER THE RASH IS GONE? Sometimes the rash may appear to be gone, but there are still microscopic changes in the skin. Using the medication and extra 2 days will ensure the inflammation has resolve and make the rash less likely to return quickly. WHAT ARE TRIGGERS? Occasionally there are specific things that trigger itching and rashes, but in many cases may none can be identified. The most common triggers are:  Heat and sweat for some individuals, cold weather for others. House dust mites, pet fur. Wool; synthetic fabrics like nylon; dyed fabrics. Tobacco smoke   Fragrances in: shampoos, soaps, lotions, laundry detergents, fabric softeners. Saliva or prolonged exposure to water. start with these. Avoid the use of fabric softeners in the washing machine or dryer sheets (unless they are fragrance-free). Try to use laundry detergents, soaps and shampoos that are fragrance-free. You may find it helpful to double-rinse your clothes. Some children are sensitive to house dust mites and they may benefit from a plastic mattress wrap. While food allergy is more common in children with eczema, foods are specific triggers for flares in only a small percentage of children. If you notice that the skin flares after certain foods you can see if eliminating one food at time makes a difference, as long as your child can still enjoy a well-balanced diet. 4) WHAT DOES THE ANTIHISTAMINE DO? Antihistamines help you not to scratch. Scratching only makes the skin more reactive and the barrier function even more disrupted. It can cause both children and their parents to lose sleep! There are different types of anti-itch medications. Some cause more drowsiness than others. Both types are acceptable depending on your child and your preference. Start with Benadryl and if that does not work, ask for a prescription “antihistamine.”    5) ARE THERE ANY SIDE EFFECTS TO WORRY ABOUT? Corticosteroid creams and ointments (generally things with "-one" or "keren" on the end of their names): The strength of the cream or ointment depends on the name of the active ingredient. The numbers at the end do not indicate the relative strength. Thus triamcinolone 0.1% ointment, considered a mid-strength corticosteroid, is much stronger than hydrocortisone 1% even though the number following the name is much lower. Topical corticosteroids are very effective in treating atopic dermatitis. When used in the manner prescribed (to rashy areas of skin and for no more than a few weeks at a time to any one area) they are very safe. These are corticosteroids and are anti-inflammatory, not the “anabolic steroids” like those used illegally by some athletes.  It is important that you do not overuse steroid creams, and if you notice a thin, shiny appearance to the skin or broken blood vessels, you should stop using the cream and consult your health care provider regarding possible overuse/overthinning of the skin. The face, armpits and groin have particularly thin and sensitive skin and are therefore most at risk for bad results if steroids are over-used in these sites. Topical non-steroid creams and ointments (immunomodulators): These creams and ointments are also called topical calcineurin inhibitors (TCIs). These include Protopic ointment and Elidel cream. Crisaborole 2% Areta Cowboy) is a prescription ointment that targets an enzyme called PDE4 (phosphodiesterase 4). It is used on the skin topically to treat mild-to-moderate eczema in adults and children 3years of age and older. In total, these nonsteroidal prescriptions are used to help decrease itching and redness in the skin. They are not as strong as most steroid creams; however, it is believed that they do not thin the skin when overused. They are generally used as second-line medications, though they may be used alone or in conjunction with topical steroids. In sensitive areas such as the face, underarms or groin, they are often recommended. They can sting inflamed skin, but are generally well tolerated once the skin is healing. The FDA placed a “black-box” warning on both Elidel and Protopic in 2006 based on animal studies using the medications. Some animals developed skin cancer and lymphoma. Subsequently, the FDA released a statement that there is no causal relationship between the two medications and cancer. Because of this concern, there are ongoing studies to evaluate this relationship in humans. So far, there are studies that support the safety of these medications.   One showed that the rates of cancer in patient using these medications topically were less than the rates of the general population and another showed that in patient's using the medication over a large area of the body, the levels of the medication in the blood was undetectable. As for Eucrisa, this product is only approved for the topical treatment of mild-to-moderate eczema in patients 3years of age and older; use of the medication in kids younger than 2 is considered “off label” and has not been formally studied. Burning and stinging are the most commonly reported side effects of this medication. Rarely, this product has been known to cause hives and hypersensitivity reactions; discontinue its use if you develop severe itching, swelling, or redness in the area of application.

## 2023-09-28 NOTE — PROGRESS NOTES
[facility] Note     Patient Name: Susan Bowie  Encounter Date: 9/28/2023    Have you been cared for by a [facility] Dermatologist in the last 3 years and, if so, which description applies to you? Yes. I have been here within the last 3 years, and my medical history has NOT changed since that time. I am MALE/not capable of bearing children. REVIEW OF SYSTEMS:  Have you recently had or currently have any of the following? · No changes in my recent health. PAST MEDICAL HISTORY:  Have you personally ever had or currently have any of the following? If "YES," then please provide more detail. · No changes in my medical history. FAMILY HISTORY:  Any "first degree relatives" (parent, brother, sister, or child) with the following? • No changes in my family's known health. PATIENT EXPERIENCE:    • Do you want the Dermatologist to perform a COMPLETE skin exam today including a clinical examination under the "bra and underwear" areas? Yes  • If necessary, do we have your permission to call and leave a detailed message on your Preferred Phone number that includes your specific medical information? Yes      Allergies   Allergen Reactions   • Cephalexin Rash      Current Outpatient Medications:   •  ibuprofen (MOTRIN) 800 mg tablet, Take 1 tablet (800 mg total) by mouth every 6 (six) hours as needed for mild pain (Patient not taking: Reported on 12/13/2022), Disp: 30 tablet, Rfl: 0  •  methocarbamol (ROBAXIN) 500 mg tablet, Take 1 tablet (500 mg total) by mouth 4 (four) times a day as needed for muscle spasms (Patient not taking: Reported on 12/13/2022), Disp: 20 tablet, Rfl: 0  •  predniSONE 50 mg tablet, Take 1 tablet (50 mg total) by mouth daily (Patient not taking: Reported on 7/26/2023), Disp: 10 tablet, Rfl: 0  •  triamcinolone (KENALOG) 0.1 % ointment, Apply topically twice a day as needed for rash. Avoid face, groin.  Do not use for more than 2 weeks at a time as can cause skin thinning, Disp: 453.6 g, Rfl: 1          • Whom besides the patient is providing clinical information about today's encounter?   o NO ADDITIONAL HISTORIAN (patient alone provided history)    Physical Exam and Assessment/Plan by Diagnosis:    ATOPIC DERMATITIS     Physical Exam:  • Anatomic Location Affected:  Trunk and extremities  • Morphological Description:  scaling, xerotic nummular patches mostly on the torso and arms, 50 % BSA. • Severity: severe IGA score 3    Additional History of Present Condition:  Present since last year, currently using Triamcinolone Cream once a day Patient not aware of the concept of Dupixient as a chronic treatment    Assessment and Plan:  Based on a thorough discussion of this condition and the management approach to it (including a comprehensive discussion of the known risks, side effects and potential benefits of treatment), the patient (family) agrees to implement the following specific plan:  • Continue with Care-Ve Cream daily and Dove for sensitive skin. • Continue with Triamcinolone ointment twice a day until resolved  • Understanding of the purpose of Dupixent as a chronic long term treatment. • We will start prior authorization process for Dupixent. • Enrollment form signed by patient today. Assessment and Plan:   Atopic Dermatitis is a chronic, itchy skin condition that is very common in children but may occur at any age. It is also known as “eczema” or “atopic eczema.” It is the most common form of dermatitis. Atopic dermatitis usually occurs in people who have an “atopic tendency.”  This means they may develop any or all of these closely linked conditions: Atopic dermatitis, asthma, hay fever (allergic rhinitis), eosinophilic esophagitis, and gastroenteritis. Often these conditions run within families with a parent, child or sibling also affected.  A family history of asthma, eczema or hay fever is particularly useful in diagnosing atopic dermatitis in infants. Atopic dermatitis arises because of a complex interaction of genetic and environmental factors. These include defects in skin barrier function making the skin more susceptible to irritation by soap and other contact irritants, the weather, temperature and non-specific triggers. There is also an element of immune system dysregulation that is often present. By definition, it is chronic and has a "waxing-waning" nature; flares should be expected but with good education and treatment strategies can be minimized. Your treatment plan  • Using only mild cleansers (hypoallergenic and without fragrances) and fragrance free detergent (not “unscented” products which contain a masking agent)  • Apply moisturizer to entire body at least 2 times a day  • Use the above listed medication to affected areas twice a day for a full 2 days after the rash has cleared  • Take on over the counter antihistamine like diphenhydramine before bed if the itching is keeping you awake      ATOPIC DERMATITIS FAQS    WHAT IS ATOPIC DERMATITIS? Atopic dermatitis (also called “eczema”) results from a weak skin barrier and an over active immune system. The weak skin barrier allows things to get into the skin and then the immune system has an intense reaction to this substances. The result is itchy red patches anywhere on the body. WHO GETS ATOPIC DERMATITIS? There is no single answer because many factors are involved. It is likely a combination of genetic makeup and environmental triggers and/or exposures. People with atopic dermatitis are prone to other types of "atopy". They are at increased risk for food allergies, Asthma and hay fever and there is often a family history of these problems as well. WHAT ABOUT FOOD ALLERGIES? This is a very controversial topic, as many believe that food allergies are responsible for skin flares.  In some cases, specific foods may cause worsening of atopic dermatitis; however this occurs in a minority of cases and usually happens within a few hours of ingestion. While food allergy is more common in children with eczema, foods are specific triggers for flares in only a small percentage of children. If you notice that the skin flares after certain foods you can see if eliminating one food at time makes a difference, as long as your child can still enjoy a well-balanced diet. There are blood (RAST) and skin (PRICK) tests that can check for allergies, but they are often positive in children who are not truly allergic. Therefore it is important that you work with your allergist and dermatologist to determine which foods are relevant and causing true symptoms. Extreme food elimination diets without the guidance of your doctor, which have become more popular in recent years, may even result in worsening of the skin rash due to malnutrition and avoidance of essential nutrients. WHY SO MUCH MOISTURIZER? This is the 1st step and most important part of treatment. This helps maintaint the skin's barrier function and prevent flares. Creams and ointments are preferable over lotions. Aveeno eczema relief and Eucerin eczema relief  and cerave are all good ones to start with. It is best to put  moisturizer on directly after bathing, while the skin is damp, it is twice as effective then. You may bathe daily. Use warm - not hot - water, for short periods of time (5-10 minutes at a time) Dry off by Lightly patting the skin with a towel and not rubbing. While the skin is still damp, (within 3 minutes) apply any medications to the rashy areas 1st and then moisturize the entire body. It's best to apply the medication 1st but if the medications sting, moisturizer can be applied 1st.    WHY USE THE MEDICATION FOR AN EXTRA 2 DAYS AFTER THE RASH IS GONE? Sometimes the rash may appear to be gone, but there are still microscopic changes in the skin.   Using the medication and extra 2 days will ensure the inflammation has resolve and make the rash less likely to return quickly. WHAT ARE TRIGGERS?  • Occasionally there are specific things that trigger itching and rashes, but in many cases may none can be identified. The most common triggers are:  • Heat and sweat for some individuals, cold weather for others. • House dust mites, pet fur. • Wool; synthetic fabrics like nylon; dyed fabrics. • Tobacco smoke   • Fragrances in: shampoos, soaps, lotions, laundry detergents, fabric softeners. • Saliva or prolonged exposure to water. start with these. Avoid the use of fabric softeners in the washing machine or dryer sheets (unless they are fragrance-free). Try to use laundry detergents, soaps and shampoos that are fragrance-free. You may find it helpful to double-rinse your clothes. Some children are sensitive to house dust mites and they may benefit from a plastic mattress wrap. While food allergy is more common in children with eczema, foods are specific triggers for flares in only a small percentage of children. If you notice that the skin flares after certain foods you can see if eliminating one food at time makes a difference, as long as your child can still enjoy a well-balanced diet. 4) WHAT DOES THE ANTIHISTAMINE DO? Antihistamines help you not to scratch. Scratching only makes the skin more reactive and the barrier function even more disrupted. It can cause both children and their parents to lose sleep! There are different types of anti-itch medications. Some cause more drowsiness than others. Both types are acceptable depending on your child and your preference. Start with Benadryl and if that does not work, ask for a prescription “antihistamine.”    5) ARE THERE ANY SIDE EFFECTS TO WORRY ABOUT? Corticosteroid creams and ointments (generally things with "-one" or "keren" on the end of their names): The strength of the cream or ointment depends on the name of the active ingredient.   The numbers at the end do not indicate the relative strength. Thus triamcinolone 0.1% ointment, considered a mid-strength corticosteroid, is much stronger than hydrocortisone 1% even though the number following the name is much lower. Topical corticosteroids are very effective in treating atopic dermatitis. When used in the manner prescribed (to rashy areas of skin and for no more than a few weeks at a time to any one area) they are very safe. These are corticosteroids and are anti-inflammatory, not the “anabolic steroids” like those used illegally by some athletes. It is important that you do not overuse steroid creams, and if you notice a thin, shiny appearance to the skin or broken blood vessels, you should stop using the cream and consult your health care provider regarding possible overuse/overthinning of the skin. The face, armpits and groin have particularly thin and sensitive skin and are therefore most at risk for bad results if steroids are over-used in these sites. Topical non-steroid creams and ointments (immunomodulators): These creams and ointments are also called topical calcineurin inhibitors (TCIs). These include Protopic ointment and Elidel cream. Crisaborole 2% Earnesteen Labrum) is a prescription ointment that targets an enzyme called PDE4 (phosphodiesterase 4). It is used on the skin topically to treat mild-to-moderate eczema in adults and children 3years of age and older. In total, these nonsteroidal prescriptions are used to help decrease itching and redness in the skin. They are not as strong as most steroid creams; however, it is believed that they do not thin the skin when overused. They are generally used as second-line medications, though they may be used alone or in conjunction with topical steroids. In sensitive areas such as the face, underarms or groin, they are often recommended. They can sting inflamed skin, but are generally well tolerated once the skin is healing.     The FDA placed a “black-box” warning on both Elidel and Protopic in 2006 based on animal studies using the medications. Some animals developed skin cancer and lymphoma. Subsequently, the FDA released a statement that there is no causal relationship between the two medications and cancer. Because of this concern, there are ongoing studies to evaluate this relationship in humans. So far, there are studies that support the safety of these medications. One showed that the rates of cancer in patient using these medications topically were less than the rates of the general population and another showed that in patient's using the medication over a large area of the body, the levels of the medication in the blood was undetectable. As for Eucrisa, this product is only approved for the topical treatment of mild-to-moderate eczema in patients 3years of age and older; use of the medication in kids younger than 2 is considered “off label” and has not been formally studied. Burning and stinging are the most commonly reported side effects of this medication. Rarely, this product has been known to cause hives and hypersensitivity reactions; discontinue its use if you develop severe itching, swelling, or redness in the area of application.       Scribe Attestation    I,:  Morgan Fernandez am acting as a scribe while in the presence of the attending physician.:       I,:  Angie Walsh MD personally performed the services described in this documentation    as scribed in my presence.:

## 2023-09-29 ENCOUNTER — TELEPHONE (OUTPATIENT)
Age: 43
End: 2023-09-29

## 2023-09-29 NOTE — TELEPHONE ENCOUNTER
Good morning,     Patient needs a prior authorization for [facility]. seems like this was requested back in July but not done, patient would like this be started soon so he can start his treatment. Enrollment form signed by patient yesterday and sent to central fax to be scanned in patient's chart.      Thank you,   Steven Dunn/cali  09/29/23  9:38 AM

## 2023-10-06 NOTE — TELEPHONE ENCOUNTER
Called [facility]  Found pt  Will be faxing over form for the [facility]  If approved then the loading dose and maintiance dose will be approved.

## 2023-10-06 NOTE — TELEPHONE ENCOUNTER
Dosing: Start 600mg SC divided in 2 sites x2 and then  300 mg SC q 2 week.   eczema is SEVERE, body surface area is 50%. He is unresponsive to topical steroids (triamcinolone 0.1% ointment) and oral prednisone 50 mg daily. EASI score: 25.2.

## 2023-10-06 NOTE — TELEPHONE ENCOUNTER
Called [facility] (pt was found by name and )  Medication handled by [facility] medicaid  Transferred over ( [phone])           Loading dose- Qty- 4pens for 28 day/ 0 refills     Maintenance dose- 2 pens per 28 days/ 10 refills         Instructions- Inject 600mg (2 pens) subcutaneously on week 0 for loading dose then 300 mg on week 2 then every other week there after for maintenance dose.

## 2023-10-10 NOTE — TELEPHONE ENCOUNTER
Received incoming fax with prior authorization form [facility], form was completed and faxed to insurance along with recent clinical notes and marked as urgent. Form was uploaded into patients chart and forms were uploaded into patients chart.

## 2023-10-12 NOTE — TELEPHONE ENCOUNTER
Received denial from insurance for the Coverage of Dupixent 300mg pen, patient was denied due to no documentation of him having tried and failed at least 2 of the following:  -Applied Triamcinolone for at least 4 weeks  - Applied Tacrolimus for a minimum of 8 weeks  -Phototherapy  -Tried and failed an oral immunosuppressant    Denial was scanned into patients chart.

## 2023-10-26 ENCOUNTER — OFFICE VISIT (OUTPATIENT)
Age: 43
End: 2023-10-26
Payer: COMMERCIAL

## 2023-10-26 VITALS — WEIGHT: 270 LBS | BODY MASS INDEX: 35.78 KG/M2 | HEIGHT: 73 IN

## 2023-10-26 DIAGNOSIS — L20.9 ATOPIC DERMATITIS, UNSPECIFIED TYPE: Primary | ICD-10-CM

## 2023-10-26 PROCEDURE — 99214 OFFICE O/P EST MOD 30 MIN: CPT | Performed by: DERMATOLOGY

## 2023-10-26 NOTE — PATIENT INSTRUCTIONS
ATOPIC DERMATITIS       Assessment and Plan:  Based on a thorough discussion of this condition and the management approach to it (including a comprehensive discussion of the known risks, side effects and potential benefits of treatment), the patient (family) agrees to implement the following specific plan:  Continue with Cera-Ve Cream daily and Dove soap for sensitive skin. Continue with Triamcinolone ointment twice a day until resolved  Discussed phototherapy treatment twice a week- we will stat prior authorization process. Assessment and Plan:   Atopic Dermatitis is a chronic, itchy skin condition that is very common in children but may occur at any age. It is also known as “eczema” or “atopic eczema.” It is the most common form of dermatitis. Atopic dermatitis usually occurs in people who have an “atopic tendency.”  This means they may develop any or all of these closely linked conditions: Atopic dermatitis, asthma, hay fever (allergic rhinitis), eosinophilic esophagitis, and gastroenteritis. Often these conditions run within families with a parent, child or sibling also affected. A family history of asthma, eczema or hay fever is particularly useful in diagnosing atopic dermatitis in infants. Atopic dermatitis arises because of a complex interaction of genetic and environmental factors. These include defects in skin barrier function making the skin more susceptible to irritation by soap and other contact irritants, the weather, temperature and non-specific triggers. There is also an element of immune system dysregulation that is often present. By definition, it is chronic and has a "waxing-waning" nature; flares should be expected but with good education and treatment strategies can be minimized.     Your treatment plan  Using only mild cleansers (hypoallergenic and without fragrances) and fragrance free detergent (not “unscented” products which contain a masking agent)  Apply moisturizer to entire body at least 2 times a day  Use the above listed medication to affected areas twice a day for a full 2 days after the rash has cleared  Take on over the counter antihistamine like diphenhydramine before bed if the itching is keeping you awake        ATOPIC DERMATITIS FAQS    WHAT IS ATOPIC DERMATITIS? Atopic dermatitis (also called “eczema”) results from a weak skin barrier and an over active immune system. The weak skin barrier allows things to get into the skin and then the immune system has an intense reaction to this substances. The result is itchy red patches anywhere on the body. WHO GETS ATOPIC DERMATITIS? There is no single answer because many factors are involved. It is likely a combination of genetic makeup and environmental triggers and/or exposures. People with atopic dermatitis are prone to other types of "atopy". They are at increased risk for food allergies, Asthma and hay fever and there is often a family history of these problems as well. WHAT ABOUT FOOD ALLERGIES? This is a very controversial topic, as many believe that food allergies are responsible for skin flares. In some cases, specific foods may cause worsening of atopic dermatitis; however this occurs in a minority of cases and usually happens within a few hours of ingestion. While food allergy is more common in children with eczema, foods are specific triggers for flares in only a small percentage of children. If you notice that the skin flares after certain foods you can see if eliminating one food at time makes a difference, as long as your child can still enjoy a well-balanced diet. There are blood (RAST) and skin (PRICK) tests that can check for allergies, but they are often positive in children who are not truly allergic. Therefore it is important that you work with your allergist and dermatologist to determine which foods are relevant and causing true symptoms.   Extreme food elimination diets without the guidance of your doctor, which have become more popular in recent years, may even result in worsening of the skin rash due to malnutrition and avoidance of essential nutrients. WHY SO MUCH MOISTURIZER? This is the 1st step and most important part of treatment. This helps maintaint the skin's barrier function and prevent flares. Creams and ointments are preferable over lotions. Aveeno eczema relief and Eucerin eczema relief  and cerave are all good ones to start with. It is best to put  moisturizer on directly after bathing, while the skin is damp, it is twice as effective then. You may bathe daily. Use warm - not hot - water, for short periods of time (5-10 minutes at a time) Dry off by Lightly patting the skin with a towel and not rubbing. While the skin is still damp, (within 3 minutes) apply any medications to the rashy areas 1st and then moisturize the entire body. It's best to apply the medication 1st but if the medications sting, moisturizer can be applied 1st.    WHY USE THE MEDICATION FOR AN EXTRA 2 DAYS AFTER THE RASH IS GONE? Sometimes the rash may appear to be gone, but there are still microscopic changes in the skin. Using the medication and extra 2 days will ensure the inflammation has resolve and make the rash less likely to return quickly. WHAT ARE TRIGGERS? Occasionally there are specific things that trigger itching and rashes, but in many cases may none can be identified. The most common triggers are:  Heat and sweat for some individuals, cold weather for others. House dust mites, pet fur. Wool; synthetic fabrics like nylon; dyed fabrics. Tobacco smoke   Fragrances in: shampoos, soaps, lotions, laundry detergents, fabric softeners. Saliva or prolonged exposure to water. start with these. Avoid the use of fabric softeners in the washing machine or dryer sheets (unless they are fragrance-free). Try to use laundry detergents, soaps and shampoos that are fragrance-free.  You may find it helpful to double-rinse your clothes. Some children are sensitive to house dust mites and they may benefit from a plastic mattress wrap. While food allergy is more common in children with eczema, foods are specific triggers for flares in only a small percentage of children. If you notice that the skin flares after certain foods you can see if eliminating one food at time makes a difference, as long as your child can still enjoy a well-balanced diet. 4) WHAT DOES THE ANTIHISTAMINE DO? Antihistamines help you not to scratch. Scratching only makes the skin more reactive and the barrier function even more disrupted. It can cause both children and their parents to lose sleep! There are different types of anti-itch medications. Some cause more drowsiness than others. Both types are acceptable depending on your child and your preference. Start with Benadryl and if that does not work, ask for a prescription “antihistamine.”    5) ARE THERE ANY SIDE EFFECTS TO WORRY ABOUT? Corticosteroid creams and ointments (generally things with "-one" or "keren" on the end of their names): The strength of the cream or ointment depends on the name of the active ingredient. The numbers at the end do not indicate the relative strength. Thus triamcinolone 0.1% ointment, considered a mid-strength corticosteroid, is much stronger than hydrocortisone 1% even though the number following the name is much lower. Topical corticosteroids are very effective in treating atopic dermatitis. When used in the manner prescribed (to rashy areas of skin and for no more than a few weeks at a time to any one area) they are very safe. These are corticosteroids and are anti-inflammatory, not the “anabolic steroids” like those used illegally by some athletes.  It is important that you do not overuse steroid creams, and if you notice a thin, shiny appearance to the skin or broken blood vessels, you should stop using the cream and consult your health care provider regarding possible overuse/overthinning of the skin. The face, armpits and groin have particularly thin and sensitive skin and are therefore most at risk for bad results if steroids are over-used in these sites. Topical non-steroid creams and ointments (immunomodulators): These creams and ointments are also called topical calcineurin inhibitors (TCIs). These include Protopic ointment and Elidel cream. Crisaborole 2% [place]) is a prescription ointment that targets an enzyme called PDE4 (phosphodiesterase 4). It is used on the skin topically to treat mild-to-moderate eczema in adults and children 3years of age and older. In total, these nonsteroidal prescriptions are used to help decrease itching and redness in the skin. They are not as strong as most steroid creams; however, it is believed that they do not thin the skin when overused. They are generally used as second-line medications, though they may be used alone or in conjunction with topical steroids. In sensitive areas such as the face, underarms or groin, they are often recommended. They can sting inflamed skin, but are generally well tolerated once the skin is healing. The FDA placed a “black-box” warning on both Elidel and Protopic in 2006 based on animal studies using the medications. Some animals developed skin cancer and lymphoma. Subsequently, the FDA released a statement that there is no causal relationship between the two medications and cancer. Because of this concern, there are ongoing studies to evaluate this relationship in humans. So far, there are studies that support the safety of these medications. One showed that the rates of cancer in patient using these medications topically were less than the rates of the general population and another showed that in patient's using the medication over a large area of the body, the levels of the medication in the blood was undetectable.     As for [place], this product is only approved for the topical treatment of mild-to-moderate eczema in patients 3years of age and older; use of the medication in kids younger than 2 is considered “off label” and has not been formally studied. Burning and stinging are the most commonly reported side effects of this medication. Rarely, this product has been known to cause hives and hypersensitivity reactions; discontinue its use if you develop severe itching, swelling, or redness in the area of application.

## 2023-10-26 NOTE — PROGRESS NOTES
[facility] Note     Patient Name: Ladi Jovel  Encounter Date: 10/26/2023     Have you been cared for by a [facility]atologist in the last 3 years and, if so, which description applies to you? Yes. I have been here within the last 3 years, and my medical history has NOT changed since that time. I am MALE/not capable of bearing children. REVIEW OF SYSTEMS:  Have you recently had or currently have any of the following? No changes in my recent health. PAST MEDICAL HISTORY:  Have you personally ever had or currently have any of the following? If "YES," then please provide more detail. No changes in my medical history. HISTORY OF IMMUNOSUPPRESSION: Do you have a history of any of the following:  Systemic Immunosuppression such as Diabetes, Biologic or Immunotherapy, Chemotherapy, Organ Transplantation, Bone Marrow Transplantation? No     Answering "YES" requires the addition of the dotphrase "IMMUNOSUPPRESSED" as the first diagnosis of the patient's visit. FAMILY HISTORY:  Any "first degree relatives" (parent, brother, sister, or child) with the following? No changes in my family's known health. PATIENT EXPERIENCE:    Do you want the Dermatologist to perform a COMPLETE skin exam today including a clinical examination under the "bra and underwear" areas? NO  If necessary, do we have your permission to call and leave a detailed message on your Preferred Phone number that includes your specific medical information?   Yes      Allergies   Allergen Reactions    Cephalexin Rash      Current Outpatient Medications:     ibuprofen (MOTRIN) 800 mg tablet, Take 1 tablet (800 mg total) by mouth every 6 (six) hours as needed for mild pain (Patient not taking: Reported on 12/13/2022), Disp: 30 tablet, Rfl: 0    methocarbamol (ROBAXIN) 500 mg tablet, Take 1 tablet (500 mg total) by mouth 4 (four) times a day as needed for muscle spasms (Patient not taking: Reported on 12/13/2022), Disp: 20 tablet, Rfl: 0    predniSONE 50 mg tablet, Take 1 tablet (50 mg total) by mouth daily (Patient not taking: Reported on 7/26/2023), Disp: 10 tablet, Rfl: 0    triamcinolone (KENALOG) 0.1 % ointment, Apply topically twice a day as needed for rash. Avoid face, groin. Do not use for more than 2 weeks at a time as can cause skin thinning, Disp: 453.6 g, Rfl: 1          Whom besides the patient is providing clinical information about today's encounter? NO ADDITIONAL HISTORIAN (patient alone provided history)      43year old male present for a follow up on atopic dermatitis. Currently on triamcinolone 0.1% ointment. We wanted to start patient on Dupixient, but this was denied by his insurance and alternative treatments suggested. Physical Exam and Assessment/Plan by Diagnosis:    ATOPIC DERMATITIS     Physical Exam:  Anatomic Location Affected:  trunk and extremities. Morphological Description:  scaling, xerotic nummular patches mostly on the torso and arms. Severity: severe, IGA score 4. Additional History of Present Condition:  Present since last year. currently using Triamcinolone Cream once a day, patient reports no improvements. We considered Dupixent but it was denied by his insurance. Assessment and Plan:  Based on a thorough discussion of this condition and the management approach to it (including a comprehensive discussion of the known risks, side effects and potential benefits of treatment), the patient (family) agrees to implement the following specific plan:  Continue with Cera-Ve Cream daily and Dove soap for sensitive skin. Continue with Triamcinolone ointment twice a day until resolved  Discussed phototherapy treatment twice a week- we will stat prior authorization process.      (Phototherapy Procedure Order and Prior Authorization)    Diagnosis:  Atopic Dermatitis/Eczema (Severe)    Total % Body Surface Area Affected by Condition at Start of Treatment:  75%    Skin Type:  IV (olive-toned skin). Rarely burns; tans with ease to moderate brown    Treatment Type:  NARROWBAND UV-B (311 nm "LOW") with PETROLATUM (mineral oil) applied by staff as photochemotherapy at time of each visit:  CPT 15371  PLEASE SUBMIT FOR PRIOR AUTHORIZATION USING THE DOCUMENTED DESCRIPTIONS AND CPT CODES    Frequency of Treatments/Schedule:  Start at "3 times a week" and adjust per clinical effect and/or per protocol. Do you want a Secondary Treatment?:  No    Starting Mjoules/MED; Maximum Dose; Increase Dose Per Treatment:  Daavlin Protocol    Additional Comments or Recommendations:  NONE. Assessment and Plan:   Atopic Dermatitis is a chronic, itchy skin condition that is very common in children but may occur at any age. It is also known as “eczema” or “atopic eczema.” It is the most common form of dermatitis. Atopic dermatitis usually occurs in people who have an “atopic tendency.”  This means they may develop any or all of these closely linked conditions: Atopic dermatitis, asthma, hay fever (allergic rhinitis), eosinophilic esophagitis, and gastroenteritis. Often these conditions run within families with a parent, child or sibling also affected. A family history of asthma, eczema or hay fever is particularly useful in diagnosing atopic dermatitis in infants. Atopic dermatitis arises because of a complex interaction of genetic and environmental factors. These include defects in skin barrier function making the skin more susceptible to irritation by soap and other contact irritants, the weather, temperature and non-specific triggers. There is also an element of immune system dysregulation that is often present. By definition, it is chronic and has a "waxing-waning" nature; flares should be expected but with good education and treatment strategies can be minimized.     Your treatment plan  Using only mild cleansers (hypoallergenic and without fragrances) and fragrance free detergent (not “unscented” products which contain a masking agent)  Apply moisturizer to entire body at least 2 times a day  Use the above listed medication to affected areas twice a day for a full 2 days after the rash has cleared  Take on over the counter antihistamine like diphenhydramine before bed if the itching is keeping you awake        ATOPIC DERMATITIS FAQS    WHAT IS ATOPIC DERMATITIS? Atopic dermatitis (also called “eczema”) results from a weak skin barrier and an over active immune system. The weak skin barrier allows things to get into the skin and then the immune system has an intense reaction to this substances. The result is itchy red patches anywhere on the body. WHO GETS ATOPIC DERMATITIS? There is no single answer because many factors are involved. It is likely a combination of genetic makeup and environmental triggers and/or exposures. People with atopic dermatitis are prone to other types of "atopy". They are at increased risk for food allergies, Asthma and hay fever and there is often a family history of these problems as well. WHAT ABOUT FOOD ALLERGIES? This is a very controversial topic, as many believe that food allergies are responsible for skin flares. In some cases, specific foods may cause worsening of atopic dermatitis; however this occurs in a minority of cases and usually happens within a few hours of ingestion. While food allergy is more common in children with eczema, foods are specific triggers for flares in only a small percentage of children. If you notice that the skin flares after certain foods you can see if eliminating one food at time makes a difference, as long as your child can still enjoy a well-balanced diet. There are blood (RAST) and skin (PRICK) tests that can check for allergies, but they are often positive in children who are not truly allergic.  Therefore it is important that you work with your allergist and dermatologist to determine which foods are relevant and causing true symptoms. Extreme food elimination diets without the guidance of your doctor, which have become more popular in recent years, may even result in worsening of the skin rash due to malnutrition and avoidance of essential nutrients. WHY SO MUCH MOISTURIZER? This is the 1st step and most important part of treatment. This helps maintaint the skin's barrier function and prevent flares. Creams and ointments are preferable over lotions. Aveeno eczema relief and Eucerin eczema relief  and cerave are all good ones to start with. It is best to put  moisturizer on directly after bathing, while the skin is damp, it is twice as effective then. You may bathe daily. Use warm - not hot - water, for short periods of time (5-10 minutes at a time) Dry off by Lightly patting the skin with a towel and not rubbing. While the skin is still damp, (within 3 minutes) apply any medications to the rashy areas 1st and then moisturize the entire body. It's best to apply the medication 1st but if the medications sting, moisturizer can be applied 1st.    WHY USE THE MEDICATION FOR AN EXTRA 2 DAYS AFTER THE RASH IS GONE? Sometimes the rash may appear to be gone, but there are still microscopic changes in the skin. Using the medication and extra 2 days will ensure the inflammation has resolve and make the rash less likely to return quickly. WHAT ARE TRIGGERS? Occasionally there are specific things that trigger itching and rashes, but in many cases may none can be identified. The most common triggers are:  Heat and sweat for some individuals, cold weather for others. House dust mites, pet fur. Wool; synthetic fabrics like nylon; dyed fabrics. Tobacco smoke   Fragrances in: shampoos, soaps, lotions, laundry detergents, fabric softeners. Saliva or prolonged exposure to water. start with these. Avoid the use of fabric softeners in the washing machine or dryer sheets (unless they are fragrance-free).   Try to use laundry detergents, soaps and shampoos that are fragrance-free. You may find it helpful to double-rinse your clothes. Some children are sensitive to house dust mites and they may benefit from a plastic mattress wrap. While food allergy is more common in children with eczema, foods are specific triggers for flares in only a small percentage of children. If you notice that the skin flares after certain foods you can see if eliminating one food at time makes a difference, as long as your child can still enjoy a well-balanced diet. 4) WHAT DOES THE ANTIHISTAMINE DO? Antihistamines help you not to scratch. Scratching only makes the skin more reactive and the barrier function even more disrupted. It can cause both children and their parents to lose sleep! There are different types of anti-itch medications. Some cause more drowsiness than others. Both types are acceptable depending on your child and your preference. Start with Benadryl and if that does not work, ask for a prescription “antihistamine.”    5) ARE THERE ANY SIDE EFFECTS TO WORRY ABOUT? Corticosteroid creams and ointments (generally things with "-one" or "keren" on the end of their names): The strength of the cream or ointment depends on the name of the active ingredient. The numbers at the end do not indicate the relative strength. Thus triamcinolone 0.1% ointment, considered a mid-strength corticosteroid, is much stronger than hydrocortisone 1% even though the number following the name is much lower. Topical corticosteroids are very effective in treating atopic dermatitis. When used in the manner prescribed (to rashy areas of skin and for no more than a few weeks at a time to any one area) they are very safe. These are corticosteroids and are anti-inflammatory, not the “anabolic steroids” like those used illegally by some athletes.  It is important that you do not overuse steroid creams, and if you notice a thin, shiny appearance to the skin or broken blood vessels, you should stop using the cream and consult your health care provider regarding possible overuse/overthinning of the skin. The face, armpits and groin have particularly thin and sensitive skin and are therefore most at risk for bad results if steroids are over-used in these sites. Topical non-steroid creams and ointments (immunomodulators): These creams and ointments are also called topical calcineurin inhibitors (TCIs). These include Protopic ointment and Elidel cream. Crisaborole 2% Solange Pi) is a prescription ointment that targets an enzyme called PDE4 (phosphodiesterase 4). It is used on the skin topically to treat mild-to-moderate eczema in adults and children 3years of age and older. In total, these nonsteroidal prescriptions are used to help decrease itching and redness in the skin. They are not as strong as most steroid creams; however, it is believed that they do not thin the skin when overused. They are generally used as second-line medications, though they may be used alone or in conjunction with topical steroids. In sensitive areas such as the face, underarms or groin, they are often recommended. They can sting inflamed skin, but are generally well tolerated once the skin is healing. The FDA placed a “black-box” warning on both Elidel and Protopic in 2006 based on animal studies using the medications. Some animals developed skin cancer and lymphoma. Subsequently, the FDA released a statement that there is no causal relationship between the two medications and cancer. Because of this concern, there are ongoing studies to evaluate this relationship in humans. So far, there are studies that support the safety of these medications.   One showed that the rates of cancer in patient using these medications topically were less than the rates of the general population and another showed that in patient's using the medication over a large area of the body, the levels of the medication in the blood was undetectable. As for Eucrisa, this product is only approved for the topical treatment of mild-to-moderate eczema in patients 3years of age and older; use of the medication in kids younger than 2 is considered “off label” and has not been formally studied. Burning and stinging are the most commonly reported side effects of this medication. Rarely, this product has been known to cause hives and hypersensitivity reactions; discontinue its use if you develop severe itching, swelling, or redness in the area of application.     Scribe Attestation      I,:  Macie Vieira am acting as a scribe while in the presence of the attending physician.:       I,:  Maura De La Cruz MD personally performed the services described in this documentation    as scribed in my presence.:

## 2023-10-27 ENCOUNTER — TELEPHONE (OUTPATIENT)
Age: 43
End: 2023-10-27

## 2023-10-27 NOTE — TELEPHONE ENCOUNTER
[facility]. PA not required for cpt code: 08532. Dx: L20.9  89337 NOT covered- Phototherapy with oil. Ref #: [id number]. Left a voicemail for patient requesting a call back to schedule first phototherapy treatment.  He will get treatment twice a week per Dr Bryant Cervantes request.     Santiago Dunn/cali  10/27/23  9:33 AM

## 2023-10-27 NOTE — TELEPHONE ENCOUNTER
Rec'd return call from patient. [address] unavailable. Patient is done working for the day. Please call to schedule phototherapy. Thank you.

## 2023-10-31 ENCOUNTER — TELEPHONE (OUTPATIENT)
Age: 43
End: 2023-10-31

## 2023-10-31 ENCOUNTER — OFFICE VISIT (OUTPATIENT)
Age: 43
End: 2023-10-31
Payer: COMMERCIAL

## 2023-10-31 DIAGNOSIS — L20.9 ATOPIC DERMATITIS, UNSPECIFIED TYPE: Primary | ICD-10-CM

## 2023-10-31 PROCEDURE — 96910 PHOTCHMTX TAR&UVB/PTRLTM&UVB: CPT | Performed by: DERMATOLOGY

## 2023-10-31 NOTE — TELEPHONE ENCOUNTER
Patient called to see if he could move his Nurse visit today at [facility] up.  I was able to move him up to 2:45

## 2023-10-31 NOTE — PROGRESS NOTES
PHOTOTHERAPY    Treatment type: Phototherapy  Visit number: 1/1  Diagnosis: Atopic dermatitis  Anatomical location: Trunk, Upper extremity, Lower extremity  Severity: Severe  BSA: (P) Other (75%)  Treatment schedule: (P) 2x weekly  Reaction: (P) None  Increase %: (P) 20%  mJoules: (P) 506.0  Total Dosed Cm2: (P) 1.14 Minutes  Extra UVA: (P) No  Topical: (P) Mineral oil  Topical applied by: (P) Patient  Special shield: (P) Goggles  Tech: (P) ER  Comments: (P) patient follows psoriasis protocol. started at 500 mJ.  treatment duration today:

## 2023-11-02 ENCOUNTER — OFFICE VISIT (OUTPATIENT)
Age: 43
End: 2023-11-02

## 2023-11-02 DIAGNOSIS — L20.9 ATOPIC DERMATITIS, UNSPECIFIED TYPE: Primary | ICD-10-CM

## 2023-11-02 NOTE — PROGRESS NOTES
PHOTOTHERAPY    Treatment type: (P) Phototherapy  Visit number: (P) 2/2  Diagnosis: (P) Atopic dermatitis  Anatomical location: (P) Trunk, Upper extremity, Lower extremity  Severity: (P) Severe  BSA: (P) Other (75%)  Treatment schedule: (P) 2x weekly  Reaction: (P) None  Increase %: (P) 20%  mJoules: (P) 606  Total Dosed Cm2: (P) 1.25 minutes- treatment duration. Extra UVA: (P) No  Max dose: (P) TBD  Topical: (P) None (patient moisturize skin at home.)  Topical applied by: (P) N/A  Special shield: (P) Goggles  Tech: (P) ER  Comments: (P) patient follow Psoriasis protocol.

## 2023-11-06 ENCOUNTER — OFFICE VISIT (OUTPATIENT)
Age: 43
End: 2023-11-06
Payer: COMMERCIAL

## 2023-11-06 DIAGNOSIS — L20.9 ATOPIC DERMATITIS, UNSPECIFIED TYPE: Primary | ICD-10-CM

## 2023-11-06 PROCEDURE — 96900 ACTINOTHERAPY UV LIGHT: CPT | Performed by: DERMATOLOGY

## 2023-11-06 NOTE — PROGRESS NOTES
PHOTOTHERAPY    Treatment type: Phototherapy  Visit number: 3/3  Diagnosis: Atopic dermatitis  Anatomical location: Trunk, Upper extremity, Lower extremity  Severity: Severe  BSA: Other  Treatment schedule:  (75%)  Reaction: Itching (mild)  Increase %: 20%  mJoules: 724.0  Total Dosed Cm2: 1.37 minutes- treatment time.   Extra UVA: No  Max dose: TBD  Topical: None  Topical applied by: N/A  Special shield: Goggles  Tech: ER  Comments: patient follows psoriasis protocol

## 2023-11-06 NOTE — PROGRESS NOTES
I agree with the documented findings and treatment plan as documented in the aforementioned Phototherapy Treatment Note.     Gladys Aragon MD 11/06/23     Gladys Aragon MD 11/06/23

## 2023-11-09 ENCOUNTER — OFFICE VISIT (OUTPATIENT)
Age: 43
End: 2023-11-09
Payer: COMMERCIAL

## 2023-11-09 VITALS — HEIGHT: 73 IN | WEIGHT: 270 LBS | BODY MASS INDEX: 35.78 KG/M2

## 2023-11-09 DIAGNOSIS — L20.9 ATOPIC DERMATITIS, UNSPECIFIED TYPE: Primary | ICD-10-CM

## 2023-11-09 PROCEDURE — 96900 ACTINOTHERAPY UV LIGHT: CPT | Performed by: DERMATOLOGY

## 2023-11-09 NOTE — PROGRESS NOTES
I agree with the documented findings and treatment plan as documented in the aforementioned Phototherapy Treatment Note. Gaby Barker MD 11/09/23 [facility] Note     Patient Name: Sandra Bills  Encounter Date: November 9, 2023     Have you been cared for by a [facility] Dermatologist in the last 3 years and, if so, which description applies to you? Yes. I have been here within the last 3 years, and my medical history has NOT changed since that time. I am MALE/not capable of bearing children. REVIEW OF SYSTEMS:  Have you recently had or currently have any of the following? No changes in my recent health. PAST MEDICAL HISTORY:  Have you personally ever had or currently have any of the following? If "YES," then please provide more detail. No changes in my medical history. HISTORY OF IMMUNOSUPPRESSION: Do you have a history of any of the following:  Systemic Immunosuppression such as Diabetes, Biologic or Immunotherapy, Chemotherapy, Organ Transplantation, Bone Marrow Transplantation? No     Answering "YES" requires the addition of the dotphrase "IMMUNOSUPPRESSED" as the first diagnosis of the patient's visit. FAMILY HISTORY:  Any "first degree relatives" (parent, brother, sister, or child) with the following? No changes in my family's known health. PATIENT EXPERIENCE:    Do you want the Dermatologist to perform a COMPLETE skin exam today including a clinical examination under the "bra and underwear" areas? NO  If necessary, do we have your permission to call and leave a detailed message on your Preferred Phone number that includes your specific medical information?   Yes      Allergies   Allergen Reactions    Cephalexin Rash      Current Outpatient Medications:     ibuprofen (MOTRIN) 800 mg tablet, Take 1 tablet (800 mg total) by mouth every 6 (six) hours as needed for mild pain (Patient not taking: Reported on 12/13/2022), Disp: 30 tablet, Rfl: 0    methocarbamol (ROBAXIN) 500 mg tablet, Take 1 tablet (500 mg total) by mouth 4 (four) times a day as needed for muscle spasms (Patient not taking: Reported on 12/13/2022), Disp: 20 tablet, Rfl: 0    predniSONE 50 mg tablet, Take 1 tablet (50 mg total) by mouth daily (Patient not taking: Reported on 7/26/2023), Disp: 10 tablet, Rfl: 0    triamcinolone (KENALOG) 0.1 % ointment, Apply topically twice a day as needed for rash. Avoid face, groin. Do not use for more than 2 weeks at a time as can cause skin thinning, Disp: 453.6 g, Rfl: 1          Whom besides the patient is providing clinical information about today's encounter? NO ADDITIONAL HISTORIAN (patient alone provided history)    Physical Exam and Assessment/Plan by Diagnosis:    Chief complaint: Patient is a 36 y/o male present for phototherapy treatment #4.     PHOTOTHERAPY    Treatment type: (P) Phototherapy  Visit number: (P) 4/4  Diagnosis: (P) Atopic dermatitis  Anatomical location: (P) Trunk, Upper extremity, Lower extremity  Severity: (P) Severe  BSA: (P) Other  Treatment schedule: (P) 2x weekly  Reaction: (P) None  Increase %: (P) 20%  mJoules: (P) 868mJ  Total Dosed Cm2: (P) 1:58  Extra UVA: (P) No  Max dose: (P) TBD  Topical: (P) None  Topical applied by: (P) N/A  Special shield: (P) Goggles  Tech: (P) NL  Comments: (P) psoriasis protocol    Scribe Attestation      I,:  Ivet Mallory am acting as a scribe while in the presence of the attending physician.:       I,:  Sigrid Kruger MD personally performed the services described in this documentation    as scribed in my presence.:

## 2023-11-13 ENCOUNTER — OFFICE VISIT (OUTPATIENT)
Age: 43
End: 2023-11-13

## 2023-11-13 DIAGNOSIS — L20.9 ATOPIC DERMATITIS, UNSPECIFIED TYPE: Primary | ICD-10-CM

## 2023-11-13 NOTE — PROGRESS NOTES
[facility] Note     Patient Name: Kelli Caal  Encounter Date: 11/13/2023     Have you been cared for by [facility] in the last 3 years and, if so, which description applies to you? Yes. I have been here within the last 3 years, and my medical history has NOT changed since that time. I am MALE/not capable of bearing children. REVIEW OF SYSTEMS:  Have you recently had or currently have any of the following? No changes in my recent health. PAST MEDICAL HISTORY:  Have you personally ever had or currently have any of the following? If "YES," then please provide more detail. No changes in my medical history. HISTORY OF IMMUNOSUPPRESSION: Do you have a history of any of the following:  Systemic Immunosuppression such as Diabetes, Biologic or Immunotherapy, Chemotherapy, Organ Transplantation, Bone Marrow Transplantation? No     Answering "YES" requires the addition of the dotphrase "IMMUNOSUPPRESSED" as the first diagnosis of the patient's visit. FAMILY HISTORY:  Any "first degree relatives" (parent, brother, sister, or child) with the following? No changes in my family's known health. PATIENT EXPERIENCE:    Do you want the Dermatologist to perform a COMPLETE skin exam today including a clinical examination under the "bra and underwear" areas? Yes  If necessary, do we have your permission to call and leave a detailed message on your Preferred Phone number that includes your specific medical information?   Yes      Allergies   Allergen Reactions    Cephalexin Rash      Current Outpatient Medications:     ibuprofen (MOTRIN) 800 mg tablet, Take 1 tablet (800 mg total) by mouth every 6 (six) hours as needed for mild pain (Patient not taking: Reported on 12/13/2022), Disp: 30 tablet, Rfl: 0    methocarbamol (ROBAXIN) 500 mg tablet, Take 1 tablet (500 mg total) by mouth 4 (four) times a day as needed for muscle spasms (Patient not taking: Reported on 12/13/2022), Disp: 20 tablet, Rfl: 0    predniSONE 50 mg tablet, Take 1 tablet (50 mg total) by mouth daily (Patient not taking: Reported on 7/26/2023), Disp: 10 tablet, Rfl: 0    triamcinolone (KENALOG) 0.1 % ointment, Apply topically twice a day as needed for rash. Avoid face, groin. Do not use for more than 2 weeks at a time as can cause skin thinning, Disp: 453.6 g, Rfl: 1          Whom besides the patient is providing clinical information about today's encounter? NO ADDITIONAL HISTORIAN (patient alone provided history)    Physical Exam and Assessment/Plan by Diagnosis:    PHOTOTHERAPY    Treatment type: (P) Phototherapy  Visit number: (P) 5/5  Diagnosis: (P) Atopic dermatitis  Anatomical location: (P) Trunk, Upper extremity, Lower extremity  Severity: (P) Severe  BSA: (P) Other (75%)  Treatment schedule: (P) 2x weekly  Reaction: (P) None  Increase %: (P) 20%  mJoules: (P) 1039.0 mJ  Total Dosed Cm2: (P) 2:08- Treatment duration. Extra UVA: (P) No  Max dose: (P) TBD  Topical: (P) None  Topical applied by: (P) N/A (Patient moisturize skin at home.)  Special shield: (P) Goggles  Tech: (P) ER  Comments: (P) patient follows psoriasis protocol.

## 2023-11-20 ENCOUNTER — OFFICE VISIT (OUTPATIENT)
Age: 43
End: 2023-11-20
Payer: COMMERCIAL

## 2023-11-20 VITALS — HEIGHT: 73 IN | WEIGHT: 270 LBS | BODY MASS INDEX: 35.78 KG/M2

## 2023-11-20 DIAGNOSIS — L20.9 ATOPIC DERMATITIS, UNSPECIFIED TYPE: Primary | ICD-10-CM

## 2023-11-20 PROCEDURE — 96900 ACTINOTHERAPY UV LIGHT: CPT | Performed by: DERMATOLOGY

## 2023-11-20 NOTE — PROGRESS NOTES
[facility]     Patient Name: Yadiel Petty  Encounter Date: November 20, 2023     Have you been cared for by a [facility] in the last 3 years and, if so, which description applies to you? Yes. I have been here within the last 3 years, and my medical history has NOT changed since that time. I am MALE/not capable of bearing children. REVIEW OF SYSTEMS:  Have you recently had or currently have any of the following? No changes in my recent health. PAST MEDICAL HISTORY:  Have you personally ever had or currently have any of the following? If "YES," then please provide more detail. No changes in my medical history. HISTORY OF IMMUNOSUPPRESSION: Do you have a history of any of the following:  Systemic Immunosuppression such as Diabetes, Biologic or Immunotherapy, Chemotherapy, Organ Transplantation, Bone Marrow Transplantation? No     Answering "YES" requires the addition of the dotphrase "IMMUNOSUPPRESSED" as the first diagnosis of the patient's visit. FAMILY HISTORY:  Any "first degree relatives" (parent, brother, sister, or child) with the following? No changes in my family's known health. PATIENT EXPERIENCE:    Do you want the Dermatologist to perform a COMPLETE skin exam today including a clinical examination under the "bra and underwear" areas? NO  If necessary, do we have your permission to call and leave a detailed message on your Preferred Phone number that includes your specific medical information?   Yes      Allergies   Allergen Reactions    Cephalexin Rash      Current Outpatient Medications:     ibuprofen (MOTRIN) 800 mg tablet, Take 1 tablet (800 mg total) by mouth every 6 (six) hours as needed for mild pain (Patient not taking: Reported on 12/13/2022), Disp: 30 tablet, Rfl: 0    methocarbamol (ROBAXIN) 500 mg tablet, Take 1 tablet (500 mg total) by mouth 4 (four) times a day as needed for muscle spasms (Patient not taking: Reported on 12/13/2022), Disp: 20 tablet, Rfl: 0    predniSONE 50 mg tablet, Take 1 tablet (50 mg total) by mouth daily (Patient not taking: Reported on 7/26/2023), Disp: 10 tablet, Rfl: 0    triamcinolone (KENALOG) 0.1 % ointment, Apply topically twice a day as needed for rash. Avoid face, groin. Do not use for more than 2 weeks at a time as can cause skin thinning, Disp: 453.6 g, Rfl: 1          Whom besides the patient is providing clinical information about today's encounter? NO ADDITIONAL HISTORIAN (patient alone provided history)    Physical Exam and Assessment/Plan by Diagnosis:    PHOTOTHERAPY    Treatment type: Phototherapy  Visit number: 6/6  Diagnosis: Atopic dermatitis  Anatomical location: Trunk, Upper extremity, Lower extremity  Severity: Severe  BSA: Other (75%)  Treatment schedule: 2x weekly  Reaction: None  Increase %: 20%  mJoules: 1247mJ  Total Dosed Cm2: 2:37  Extra UVA: No  Max dose: TBD  Topical: None  Topical applied by: N/A  Special shield: Goggles  Tech: NL  Comments:  Follow Psoriasis Protocol    Scribe Attestation      I,:  Jasmin Patino am acting as a scribe while in the presence of the attending physician.:       I,:  Keila Lewis MD personally performed the services described in this documentation    as scribed in my presence.:

## 2023-11-22 ENCOUNTER — OFFICE VISIT (OUTPATIENT)
Age: 43
End: 2023-11-22
Payer: COMMERCIAL

## 2023-11-22 VITALS — HEIGHT: 73 IN | WEIGHT: 270 LBS | BODY MASS INDEX: 35.78 KG/M2

## 2023-11-22 DIAGNOSIS — L20.9 ATOPIC DERMATITIS, UNSPECIFIED TYPE: Primary | ICD-10-CM

## 2023-11-22 PROCEDURE — 96900 ACTINOTHERAPY UV LIGHT: CPT | Performed by: DERMATOLOGY

## 2023-11-22 NOTE — PROGRESS NOTES
[facility] Note     Patient Name: Zaire Myrick  Encounter Date: November 22, 2023     Have you been cared for by a [facility] in the last 3 years and, if so, which description applies to you? Yes. I have been here within the last 3 years, and my medical history has NOT changed since that time. I am MALE/not capable of bearing children. REVIEW OF SYSTEMS:  Have you recently had or currently have any of the following? No changes in my recent health. PAST MEDICAL HISTORY:  Have you personally ever had or currently have any of the following? If "YES," then please provide more detail. No changes in my medical history. HISTORY OF IMMUNOSUPPRESSION: Do you have a history of any of the following:  Systemic Immunosuppression such as Diabetes, Biologic or Immunotherapy, Chemotherapy, Organ Transplantation, Bone Marrow Transplantation? No     Answering "YES" requires the addition of the dotphrase "IMMUNOSUPPRESSED" as the first diagnosis of the patient's visit. FAMILY HISTORY:  Any "first degree relatives" (parent, brother, sister, or child) with the following? No changes in my family's known health. PATIENT EXPERIENCE:    Do you want the Dermatologist to perform a COMPLETE skin exam today including a clinical examination under the "bra and underwear" areas? NO  If necessary, do we have your permission to call and leave a detailed message on your Preferred Phone number that includes your specific medical information?   Yes      Allergies   Allergen Reactions    Cephalexin Rash      Current Outpatient Medications:     ibuprofen (MOTRIN) 800 mg tablet, Take 1 tablet (800 mg total) by mouth every 6 (six) hours as needed for mild pain (Patient not taking: Reported on 12/13/2022), Disp: 30 tablet, Rfl: 0    methocarbamol (ROBAXIN) 500 mg tablet, Take 1 tablet (500 mg total) by mouth 4 (four) times a day as needed for muscle spasms (Patient not taking: Reported on 12/13/2022), Disp: 20 tablet, Rfl: 0    predniSONE 50 mg tablet, Take 1 tablet (50 mg total) by mouth daily (Patient not taking: Reported on 7/26/2023), Disp: 10 tablet, Rfl: 0    triamcinolone (KENALOG) 0.1 % ointment, Apply topically twice a day as needed for rash. Avoid face, groin. Do not use for more than 2 weeks at a time as can cause skin thinning, Disp: 453.6 g, Rfl: 1          Whom besides the patient is providing clinical information about today's encounter? NO ADDITIONAL HISTORIAN (patient alone provided history)    Physical Exam and Assessment/Plan by Diagnosis:    Chief complaint: Patient present for Phototherapy tx #7 for Atopic Dermatitis. PHOTOTHERAPY    Treatment type: Phototherapy  Visit number: 7/7  Diagnosis: Atopic dermatitis  Anatomical location: Trunk, Upper extremity, Lower extremity  Severity: Severe  BSA: Other  Treatment schedule: 2x weekly  Reaction: None  Increase %: 20%  mJoules: 1498mJ  Total Dosed Cm2: 3:05  Extra UVA: No  Max dose: TBD  Topical: None  Topical applied by: N/A  Special shield: Goggles  Tech: NL  Comments:  Follow Psoriasis protocol        Scribe Attestation      I,:  Celestine Sofia am acting as a scribe while in the presence of the attending physician.:       I,:  Deonna Reilly MD personally performed the services described in this documentation    as scribed in my presence.:

## 2023-11-24 ENCOUNTER — TELEPHONE (OUTPATIENT)
Age: 43
End: 2023-11-24

## 2023-11-24 NOTE — TELEPHONE ENCOUNTER
Left a voicemail for patient requesting a call back. He gets phototherapy treatments on Mondays afternoon. Dr Asim Guthrie will not be in the office on Monday afternoon.  Is he able to come in on Monday between 8:00 and 9:00 am?       Steven Dunn/cali  11/24/23  9:45 AM

## 2023-11-27 NOTE — TELEPHONE ENCOUNTER
Left a voicemail for patient requesting a call back to see if he is able to come in this morning for his phototherapy treatment since we have no provider this afternoon.      Tanya Dunn/cali  11/27/23  8:21 AM

## 2023-12-07 ENCOUNTER — OFFICE VISIT (OUTPATIENT)
Age: 43
End: 2023-12-07
Payer: COMMERCIAL

## 2023-12-07 VITALS — WEIGHT: 270 LBS | HEIGHT: 73 IN | BODY MASS INDEX: 35.78 KG/M2

## 2023-12-07 DIAGNOSIS — L20.9 ATOPIC DERMATITIS, UNSPECIFIED TYPE: Primary | ICD-10-CM

## 2023-12-07 PROCEDURE — 96900 ACTINOTHERAPY UV LIGHT: CPT | Performed by: DERMATOLOGY

## 2023-12-07 NOTE — PROGRESS NOTES
[facility]     Patient Name: Maylin Smith  Encounter Date: December 7, 2023     Have you been cared for by a [facility] in the last 3 years and, if so, which description applies to you? Yes. I have been here within the last 3 years, and my medical history has NOT changed since that time. I am MALE/not capable of bearing children. REVIEW OF SYSTEMS:  Have you recently had or currently have any of the following? No changes in my recent health. PAST MEDICAL HISTORY:  Have you personally ever had or currently have any of the following? If "YES," then please provide more detail. No changes in my medical history. HISTORY OF IMMUNOSUPPRESSION: Do you have a history of any of the following:  Systemic Immunosuppression such as Diabetes, Biologic or Immunotherapy, Chemotherapy, Organ Transplantation, Bone Marrow Transplantation? No     Answering "YES" requires the addition of the dotphrase "IMMUNOSUPPRESSED" as the first diagnosis of the patient's visit. FAMILY HISTORY:  Any "first degree relatives" (parent, brother, sister, or child) with the following? No changes in my family's known health. PATIENT EXPERIENCE:    Do you want the Dermatologist to perform a COMPLETE skin exam today including a clinical examination under the "bra and underwear" areas? NO  If necessary, do we have your permission to call and leave a detailed message on your Preferred Phone number that includes your specific medical information?   Yes      Allergies   Allergen Reactions    Cephalexin Rash      Current Outpatient Medications:     ibuprofen (MOTRIN) 800 mg tablet, Take 1 tablet (800 mg total) by mouth every 6 (six) hours as needed for mild pain (Patient not taking: Reported on 12/13/2022), Disp: 30 tablet, Rfl: 0    methocarbamol (ROBAXIN) 500 mg tablet, Take 1 tablet (500 mg total) by mouth 4 (four) times a day as needed for muscle spasms (Patient not taking: Reported on 12/13/2022), Disp: 20 tablet, Rfl: 0    predniSONE 50 mg tablet, Take 1 tablet (50 mg total) by mouth daily (Patient not taking: Reported on 7/26/2023), Disp: 10 tablet, Rfl: 0    triamcinolone (KENALOG) 0.1 % ointment, Apply topically twice a day as needed for rash. Avoid face, groin. Do not use for more than 2 weeks at a time as can cause skin thinning, Disp: 453.6 g, Rfl: 1          Whom besides the patient is providing clinical information about today's encounter? NO ADDITIONAL HISTORIAN (patient alone provided history)    Physical Exam and Assessment/Plan by Diagnosis:    PHOTOTHERAPY    Treatment type: (P) Phototherapy  Visit number: (P) 8/8  Diagnosis: (P) Atopic dermatitis  Anatomical location: (P) Trunk, Upper extremity, Lower extremity  Severity: (P) Severe  BSA: (P) Other (75%)  Treatment schedule: (P) 2x weekly  Reaction: (P) None  Increase %: (P) 20%  mJoules: (P) 1603mJ  Total Dosed Cm2: (P) 3:22  Extra UVA: (P) No  Max dose: (P) TBD  Topical: (P) None  Topical applied by: (P) N/A  Special shield: (P) Goggles  Tech: (P) NL  Comments: (P) Treatment increased today by only 100mJ due to 1 week gap between treatments. Continue to increase as normal at following treatment if no reaction.     Scribe Attestation      I,:  Irving Schultz am acting as a scribe while in the presence of the attending physician.:       I,:  Ron Patterson MD personally performed the services described in this documentation    as scribed in my presence.:

## 2023-12-18 ENCOUNTER — OFFICE VISIT (OUTPATIENT)
Age: 43
End: 2023-12-18
Payer: COMMERCIAL

## 2023-12-18 VITALS — BODY MASS INDEX: 35.78 KG/M2 | WEIGHT: 270 LBS | HEIGHT: 73 IN

## 2023-12-18 DIAGNOSIS — L20.9 ATOPIC DERMATITIS, UNSPECIFIED TYPE: Primary | ICD-10-CM

## 2023-12-18 PROCEDURE — 96900 ACTINOTHERAPY UV LIGHT: CPT | Performed by: DERMATOLOGY

## 2023-12-18 NOTE — PROGRESS NOTES
[facility] Note     Patient Name: Ravindra Bran  Encounter Date: December 18, 2023       PHOTOTHERAPY    Treatment type: Phototherapy  Visit number: 9/9  Diagnosis: Atopic dermatitis  Anatomical location: Trunk, Upper extremity, Lower extremity  Severity: Severe  BSA: Other (75%)  Treatment schedule: 2x weekly  Reaction: None  Increase %: Other (1 week gap - no increase)  mJoules: 1623  Total Dosed Cm2: 3:30  Extra UVA: No  Max dose: TBD  Topical: None  Topical applied by: N/A  Special shield: Goggles  Tech: NL  Comments: Treatment remained the same, since last visit    Scribe Attestation      I,:  Gertrude Chacon am acting as a scribe while in the presence of the attending physician.:       I,:  Rowdy Huffman MD personally performed the services described in this documentation    as scribed in my presence.:

## 2023-12-26 ENCOUNTER — OFFICE VISIT (OUTPATIENT)
Age: 43
End: 2023-12-26
Payer: COMMERCIAL

## 2023-12-26 VITALS — HEIGHT: 73 IN | WEIGHT: 270 LBS | BODY MASS INDEX: 35.78 KG/M2

## 2023-12-26 DIAGNOSIS — L20.9 ATOPIC DERMATITIS, UNSPECIFIED TYPE: Primary | ICD-10-CM

## 2023-12-26 PROCEDURE — 96900 ACTINOTHERAPY UV LIGHT: CPT | Performed by: DERMATOLOGY

## 2023-12-26 NOTE — PROGRESS NOTES
PHOTOTHERAPY    Treatment type: Phototherapy  Visit number: 10/10  Diagnosis: Atopic dermatitis  Anatomical location: Trunk, Upper extremity, Lower extremity  Severity: Severe  BSA: Other (75%)  Treatment schedule: 2x weekly  Reaction: None  Increase %: 20%  mJoules: 2308mJ  Total Dosed Cm2: 4:35  Extra UVA: No  Max dose: TBD  Topical: None  Topical applied by: N/A  Special shield: Goggles  Tech: NL  Comments: no, reaction increase continued per protocol    Scribe Attestation      I,:  Gertrude Chacon am acting as a scribe while in the presence of the attending physician.:       I,:  Rowdy Huffman MD personally performed the services described in this documentation    as scribed in my presence.:

## 2024-01-11 ENCOUNTER — OFFICE VISIT (OUTPATIENT)
Age: 44
End: 2024-01-11
Payer: COMMERCIAL

## 2024-01-11 VITALS — HEIGHT: 73 IN | BODY MASS INDEX: 35.78 KG/M2 | WEIGHT: 270 LBS

## 2024-01-11 DIAGNOSIS — L20.9 ATOPIC DERMATITIS, UNSPECIFIED TYPE: Primary | ICD-10-CM

## 2024-01-11 DIAGNOSIS — R21 DIFFUSE PAPULAR RASH: ICD-10-CM

## 2024-01-11 PROCEDURE — 99214 OFFICE O/P EST MOD 30 MIN: CPT | Performed by: DERMATOLOGY

## 2024-01-11 RX ORDER — TRIAMCINOLONE ACETONIDE 1 MG/G
OINTMENT TOPICAL
Qty: 453.6 G | Refills: 1 | Status: SHIPPED | OUTPATIENT
Start: 2024-01-11

## 2024-01-11 NOTE — PROGRESS NOTES
"[facility] Note     Patient Name: Ravindra Bran  Encounter Date: January 11, 2024     Have you been cared for by a [facility] D[facility]atologist in the last 3 years and, if so, which description applies to you?    Yes.  I have been here within the last 3 years, and my medical history has NOT changed since that time.  I am MALE/not capable of bearing children.    REVIEW OF SYSTEMS:  Have you recently had or currently have any of the following? No changes in my recent health.   PAST MEDICAL HISTORY:  Have you personally ever had or currently have any of the following?  If \"YES,\" then please provide more detail. No changes in my medical history.   HISTORY OF IMMUNOSUPPRESSION: Do you have a history of any of the following:  Systemic Immunosuppression such as Diabetes, Biologic or Immunotherapy, Chemotherapy, Organ Transplantation, Bone Marrow Transplantation?  No     Answering \"YES\" requires the addition of the dotphrase \"IMMUNOSUPPRESSED\" as the first diagnosis of the patient's visit.   FAMILY HISTORY:  Any \"first degree relatives\" (parent, brother, sister, or child) with the following?    No changes in my family's known health.   PATIENT EXPERIENCE:    Do you want the Dermatologist to perform a COMPLETE skin exam today including a clinical examination under the \"bra and underwear\" areas?  Yes  If necessary, do we have your permission to call and leave a detailed message on your Preferred Phone number that includes your specific medical information?  Yes      Allergies   Allergen Reactions    Cephalexin Rash      Current Outpatient Medications:     triamcinolone (KENALOG) 0.1 % ointment, Apply topically twice a day as needed for rash. Avoid face, groin. Do not use for more than 2 weeks at a time as can cause skin thinning, Disp: 453.6 g, Rfl: 1    ibuprofen (MOTRIN) 800 mg tablet, Take 1 tablet (800 mg total) by mouth every 6 (six) hours as needed for mild pain (Patient not taking: Reported on " "12/13/2022), Disp: 30 tablet, Rfl: 0    methocarbamol (ROBAXIN) 500 mg tablet, Take 1 tablet (500 mg total) by mouth 4 (four) times a day as needed for muscle spasms (Patient not taking: Reported on 12/13/2022), Disp: 20 tablet, Rfl: 0    predniSONE 50 mg tablet, Take 1 tablet (50 mg total) by mouth daily (Patient not taking: Reported on 7/26/2023), Disp: 10 tablet, Rfl: 0          Whom besides the patient is providing clinical information about today's encounter?   NO ADDITIONAL HISTORIAN (patient alone provided history)    Physical Exam and Assessment/Plan by Diagnosis:    Chief complaint: Patient is a 42 y/o male present for follow up of Atopic Dermatitis, currently using Triamcinolone Ointment Twice a day which helps and he stopped phototherapy b/c he developed a mild sunburned that lasted 1.5 days after the last treatment. He states the thighs and hands are still active.    ATOPIC DERMATITIS (\"childhood Eczema\")    Physical Exam:  Anatomic Location Affected:  Scattered  Morphological Description:  scaling and erythema  Body Surface Area Today:  less than 5%  Overall Severity: mild  Pertinent Positives:  Pertinent Negatives:    Additional History of Present Condition:  Patient received Phototherapy with some improvement    Assessment and Plan:  Based on a thorough discussion of this condition and the management approach to it (including a comprehensive discussion of the known risks, side effects and potential benefits of treatment), the patient (family) agrees to implement the following specific plan:  Continue Triamcinolone Ointment  Hold off on Dupixent Prior Auth.     Assessment and Plan:   Atopic Dermatitis is a chronic, itchy skin condition that is very common in children but may occur at any age. It is also known as “eczema” or “atopic eczema.” It is the most common form of dermatitis.    Atopic dermatitis usually occurs in people who have an “atopic tendency.”  This means they may develop any or all of " "these closely linked conditions:  Atopic dermatitis, asthma, hay fever (allergic rhinitis), eosinophilic esophagitis, and gastroenteritis.  Often these conditions run within families with a parent, child or sibling also affected. A family history of asthma, eczema or hay fever is particularly useful in diagnosing atopic dermatitis in infants.    Atopic dermatitis arises because of a complex interaction of genetic and environmental factors. These include defects in skin barrier function making the skin more susceptible to irritation by soap and other contact irritants, the weather, temperature and non-specific triggers.  There is also an element of immune system dysregulation that is often present.  By definition, it is chronic and has a \"waxing-waning\" nature; flares should be expected but with good education and treatment strategies can be minimized.    Some specific tips we discussed:  Dry skin care.  Using only mild cleansers (hypoallergenic and without fragrances) and fragrance free detergent (not “unscented” products which contain a masking agent); we discussed avoiding irritants/fragranced products.  The importance of regular application of moisturizers daily (at least 3 times a day)  The known and theoretical side effects of steroids at length, including but not limited to atrophy of skin and increased pressure in eye (glaucoma) and clouding of the eye's lens (cataracts) if used in or around the eye for extended durations.  The specific over-the-counter interventions and medications.  Side effects, risks and benefits of topical and oral medications discussed.  After lengthy discussion of etiology and treatment options, we decided to implement the following personalized treatment plan:    TINEA CRURIS (\"JOCK ITCH\")    Physical Exam:  Anatomic Location Affected:  Inner Thighs  Morphological Description:  annular scaling and erythema  Pertinent Positives:  Pertinent Negatives:    Additional History of Present " Condition:  KOH Positive    Assessment and Plan:  Based on a thorough discussion of this condition and the management approach to it (including a comprehensive discussion of the known risks, side effects and potential benefits of treatment), the patient (family) agrees to implement the following specific plan:  Lamisil OTC daily for 3 weeks then stop    What is tinea cruris?  Tinea cruris is the name used for infection of the groin with a dermatophyte fungus. It is most often seen in adult men. Tinea cruris is commonly known as jock itch.    In different parts of the world, different species cause tinea cruris. In [place], Trichophyton rubrum and Epidermophyton floccosum are the most common causes. Infection often comes from the feet (tinea pedis) or nails (tinea unguium) originally, spread by scratching or the use of an infected towel.    The appearance is similar to ringworm (tinea corporis). The rash has a scaly raised red border that spreads down the inner thighs from the groin or scrotum. Tinea cruris may form ring-like patterns on the buttocks. It is not often seen on the penis or vulva or around the anus. Tinea cruris can be very itchy.    Tinea cruris can be confused with other forms of intertrigo such as:  Candida   Erythrasma   Psoriasis   Seborrhoeic dermatitis    Tinea cruris quite often recurs after apparently successful treatment. To reduce the chance of reinfection:  Treat the feet if tinea pedis is present.   Dry the groin carefully after bathing using a separate towel.   Do not share towels, sheets or personal clothing.   Avoid wearing occlusive or synthetic clothing.   If you are overweight, try to lose weight to reduce chafing and sweating.    Diagnosis of tinea cruris  The diagnosis of tinea cruris is confirmed by microscopy and culture of skin scrapings.    Treatment of tinea cruris  Tinea cruris is usually treated with topical antifungal agents. Sometimes hydrocortisone is added, for faster  relief of itch. Topical steroids should not be used on their own. If the treatment is unsuccessful, oral antifungal medicines may be considered, including terbinafine and itraconazole.    Scribe Attestation      I,:  Gertrude Chacon am acting as a scribe while in the presence of the attending physician.:       I,:  Rowdy Huffman MD personally performed the services described in this documentation    as scribed in my presence.:

## 2024-01-11 NOTE — PATIENT INSTRUCTIONS
EDUCATION AS INTERVENTION!    WHAT IS ATOPIC DERMATITIS?  Atopic dermatitis (also called “eczema”) is a condition of the skin where the skin is dry, red, and itchy.  The main function of the skin is to provide a barrier from the environment and is also the first defense of the immune system.    In atopic dermatitis the skin barrier is decreased or disrupted, and the skin is easily irritated.  As a result, moisture escapes the skin more easily, and environmental allergens and microbes can enter the skin more easily.  Consequently, the skin's immune system is altered.  If there are increased allergic type cells in the skin, the skin may become red and “hyper-excitable.” This leads to itching and a subsequent rash.    WHY DO PEOPLE GET ATOPIC DERMATITIS?  There is no single answer because many factors are involved.  It is likely a combination of genetic makeup and environmental triggers and/or exposures. Excessive drying or sweating of the skin, Irritating soaps, dust mites, and pet dander are some of the more common triggers.  There is no blood test that can be done to confirm this diagnosis. The history and appearance of the skin is usually sufficient for a diagnosis. However, in some cases if the rash does not fit with the history or respond appropriately to treatment, a skin biopsy may be helpful.  Many children do outgrow atopic dermatitis or get better; however, many continue to have sensitive skin into adulthood.  Asthma and hay fever are often seen in many patients with atopic dermatitis; however, asthma flares do not necessarily occur at the same time as skin flares.     PREVENTING FLARES OF ATOPIC DERMATITIS  The first step is to maintain the skin's barrier function.  Keep the skin well moisturized.  Avoid irritants and triggers.  Use prescribed medicine when there are red or rough areas to help the skin to return to normal as quickly as possible.  Try to limit scratching.     If you keep the skin well  moisturized, and avoid coming in contact with things you know irritate your child's skin, there will be less flares.  However, some flares of atopic dermatitis are beyond your control.  You should work with your health care provider to come up with a plan that minimizes flares while minimizing long term use of medications that suppress the immune system.        WHAT ARE SOME OF THE TRIGGERS?  Triggers are different for different people. The most common triggers are:  Heat and sweat for some individuals, cold weather for others.  House dust mites, pet fur.  Wool; synthetic fabrics like nylon; dyed fabrics.  Tobacco smoke   Fragrances in: shampoos, soaps, lotions, laundry detergents, fabric softeners.  Saliva or prolonged exposure to water.    WHAT ABOUT FOOD ALLERGIES?  This is a very controversial topic, as many believe that food allergies are responsible for skin flares. In some cases, specific foods may cause worsening of atopic dermatitis; however this occurs in a minority of cases and usually happens within a few hours of ingestion. While food allergy is more common in children with eczema, foods are specific triggers for flares in only a small percentage of children.  If you notice that the skin flares after certain foods you can see if eliminating one food at time makes a difference, as long as your child can still enjoy a well-balanced diet.   There are blood (RAST) and skin (PRICK) tests that can check for allergies, but they are often positive in children who are not truly allergic. Therefore it is important that you work with your allergist and dermatologist to determine which foods are relevant and causing true symptoms.  Extreme food elimination diets without the guidance of your doctor, which have become more popular in recent years, may even result in worsening of the skin rash due to malnutrition and avoidance of essential nutrients.    TREATMENT  Treatments are aimed at minimizing exposure to  irritating factors and decreasing  the skin inflammation which results in an itchy rash.There are many different treatment options, which depend on your child's rash, its location, and severity.  Topical treatments include corticosteroids and steroid-like creams such as Protopic, Elidel, and Eucrisa, which are believed to not thin the skin.  Please read the discussions below regarding risks and benefits of all of these creams.    Occasionally bacterial or viral infections can occur which flare the skin and require oral and/or topical antibiotics or antivirals. In some cases bleach baths 2-3 times weekly can be helpful to prevent recurrent infection.    For severe disease, strong oral medications such as corticosteroids, methotrexate or azathioprine (Imuran) may be needed. These medications require close monitoring and follow-up. You should discuss the risks/ benefits/alternatives of these medications with your health care provider to come up with the best treatment plan for your child.    1) Use moisturizer all over the entire body at least THREE TIMES a day.  This keeps the skin moisturized to restore the barrier function.  Find a cream or ointment that your child likes - this is the most important.  The medicines do not work in the bottle.  The thicker the moisturizer, generally the better barrier it provides.  Ointments often moisturize better than creams; and creams work better than lotions.  Lotions are more useful during the summer when thick greasy ointments are uncomfortable.  If you put moisturizer on the skin after bathing, while the skin is damp, it is twice as effective.  The moisturizer provides a seal holding the water in the skin.  You may bathe your child in warm - not hot - water, for short periods of time (no more than 5-10 minutes at a time) once a day if they like.  Lightly pat your child dry with a towel and, while the skin is still damp, (within 3 minutes) apply a moisturizer from head to toe.   If your child is using a medicated cream, apply it and allow it to absorb completely BEFORE you apply the moisturizer.    2) Apply the prescription medication TWICE A DAY to only the red, rough areas on the skin OR AS DIRECTED BY YOUR HEALTH CARE PROVIDER  Put the medication on your fingers and gently rub it into the areas.  Usually the medicine will help an area within a few days time.  Try to put the medicine on for two days after you have noticed that the redness is no longer present; this will help the redness from returning.  The severity of the rash and the strength and usage of the medication will determine how quickly you see improvement.    It is important that you do not overuse steroid creams, and if you notice a thin, shiny appearance to the skin or broken blood vessels, you should stop using the cream and consult your health care provider regarding possible overuse/overthinning of the skin.  The face, armpits and groin have particularly thin and sensitive skin and are therefore most at risk for bad results if steroids are over-used in these sites.      3) Avoid triggers.    Some children have specific things that trigger itching and rashes, while others may have none that can be identified.  It may require a little bit of trial and error to see what applies to your child.  Also, triggers can change over time for your child. The most common triggers are listed above; start with these.  Avoid the use of fabric softeners in the washing machine or dryer sheets (unless they are fragrance-free).  Try to use laundry detergents, soaps and shampoos that are fragrance-free. You may find it helpful to double-rinse your clothes.  Some children are sensitive to house dust mites and they may benefit from a plastic mattress wrap.  While food allergy is more common in children with eczema, foods are specific triggers for flares in only a small percentage of children.  If you notice that the skin flares after certain  "foods you can see if eliminating one food at time makes a difference, as long as your child can still enjoy a well-balanced diet.     4) Consider using a medication like an anti-histamine by mouth to help control the itching.    Scratching only makes the skin more reactive and the barrier function even more disrupted.  It can cause both children and their parents to lose sleep!  There are different types of anti-itch medications.  Some cause more drowsiness than others.  Both types are acceptable depending on your child and your preference.  Start with Benadryl and if that does not work, ask for a prescription “antihistamine.”    5) About the prescription creams:  Corticosteroid creams and ointments (generally things with \"-one\" or \"keren\" on the end of their names):  The strength of the cream or ointment depends on the name of the active ingredient.  The numbers at the end do not indicate the relative strength.  Thus triamcinolone 0.1% ointment, considered a mid-strength corticosteroid, is much stronger than hydrocortisone 1% even though the number following the name is much lower.  Topical corticosteroids are very effective in treating atopic dermatitis.  When used in the manner prescribed (to rashy areas of skin and for no more than a few weeks at a time to any one area) they are very safe.  These are corticosteroids and are anti-inflammatory, not the “anabolic steroids” like those used illegally by some athletes.     Topical non-steroid creams and ointments (immunomodulators):  These creams and ointments are also called topical calcineurin inhibitors (TCIs).  These include Protopic ointment and Elidel cream. Crisaborole 2% (Eucrisa) is a prescription ointment that targets an enzyme called PDE4 (phosphodiesterase 4).  It is used on the skin topically to treat mild-to-moderate eczema in adults and children 2 years of age and older.  In total, these nonsteroidal prescriptions are used to help decrease itching and " "redness in the skin.  They are not as strong as most steroid creams; however, it is believed that they do not thin the skin when overused.  They are generally used as second-line medications, though they may be used alone or in conjunction with topical steroids.  In sensitive areas such as the face, underarms or groin, they are often recommended.  They can sting inflamed skin, but are generally well tolerated once the skin is healing.    The FDA placed a “black-box” warning on both Elidel and Protopic in 2006 based on animal studies using the medications.  Some animals developed skin cancer and lymphoma.  Subsequently, the FDA released a statement that there is no causal relationship between the two medications and cancer.  Because of this concern, there are ongoing studies to evaluate this relationship in humans.  So far, there are studies that support the safety of these medications.  One showed that the rates of cancer in patient using these medications topically were less than the rates of the general population and another showed that in patient's using the medication over a large area of the body, the levels of the medication in the blood was undetectable.    As for Eucrisa, this product is only approved for the topical treatment of mild-to-moderate eczema in patients 2 years of age and older; use of the medication in kids younger than 2 is considered “off label” and has not been formally studied.  Burning and stinging are the most commonly reported side effects of this medication.  Rarely, this product has been known to cause hives and hypersensitivity reactions; discontinue its use if you develop severe itching, swelling, or redness in the area of application.    TINEA CRURIS (\"JOCK ITCH\")    Physical Exam:  Anatomic Location Affected:  Inner Thighs  Morphological Description:  annular scaling and erythema  Pertinent Positives:  Pertinent Negatives:    Additional History of Present Condition:  KOH " Positive    Assessment and Plan:  Based on a thorough discussion of this condition and the management approach to it (including a comprehensive discussion of the known risks, side effects and potential benefits of treatment), the patient (family) agrees to implement the following specific plan:  Lamisil OTC daily for 3 weeks then stop    What is tinea cruris?  Tinea cruris is the name used for infection of the groin with a dermatophyte fungus. It is most often seen in adult men. Tinea cruris is commonly known as jock itch.    In different parts of the world, different species cause tinea cruris. In [place], Trichophyton rubrum and Epidermophyton floccosum are the most common causes. Infection often comes from the feet (tinea pedis) or nails (tinea unguium) originally, spread by scratching or the use of an infected towel.    The appearance is similar to ringworm (tinea corporis). The rash has a scaly raised red border that spreads down the inner thighs from the groin or scrotum. Tinea cruris may form ring-like patterns on the buttocks. It is not often seen on the penis or vulva or around the anus. Tinea cruris can be very itchy.    Tinea cruris can be confused with other forms of intertrigo such as:  Candida   Erythrasma   Psoriasis   Seborrhoeic dermatitis    Tinea cruris quite often recurs after apparently successful treatment. To reduce the chance of reinfection:  Treat the feet if tinea pedis is present.   Dry the groin carefully after bathing using a separate towel.   Do not share towels, sheets or personal clothing.   Avoid wearing occlusive or synthetic clothing.   If you are overweight, try to lose weight to reduce chafing and sweating.    Diagnosis of tinea cruris  The diagnosis of tinea cruris is confirmed by microscopy and culture of skin scrapings.    Treatment of tinea cruris  Tinea cruris is usually treated with topical antifungal agents. Sometimes hydrocortisone is added, for faster relief of itch.  Topical steroids should not be used on their own. If the treatment is unsuccessful, oral antifungal medicines may be considered, including terbinafine and itraconazole.

## 2024-04-17 ENCOUNTER — OFFICE VISIT (OUTPATIENT)
Age: 44
End: 2024-04-17
Payer: COMMERCIAL

## 2024-04-17 VITALS — HEIGHT: 73 IN | TEMPERATURE: 98.4 F | WEIGHT: 265 LBS | BODY MASS INDEX: 35.12 KG/M2

## 2024-04-17 DIAGNOSIS — R21 DIFFUSE PAPULAR RASH: ICD-10-CM

## 2024-04-17 DIAGNOSIS — L20.9 ATOPIC DERMATITIS, UNSPECIFIED TYPE: Primary | ICD-10-CM

## 2024-04-17 PROCEDURE — 99213 OFFICE O/P EST LOW 20 MIN: CPT | Performed by: DERMATOLOGY

## 2024-04-17 RX ORDER — TRIAMCINOLONE ACETONIDE 1 MG/G
OINTMENT TOPICAL
Qty: 453.6 G | Refills: 1 | Status: SHIPPED | OUTPATIENT
Start: 2024-04-17

## 2024-04-17 NOTE — PROGRESS NOTES
"[facility] Note     Patient Name: Ravindra Bran  Encounter Date: April 17, 2024     Have you been cared for by a [facility] in the last 3 years and, if so, which description applies to you?    Yes.  I have been here within the last 3 years, and my medical history has NOT changed since that time.  I am MALE/not capable of bearing children.    REVIEW OF SYSTEMS:  Have you recently had or currently have any of the following? No changes in my recent health.   PAST MEDICAL HISTORY:  Have you personally ever had or currently have any of the following?  If \"YES,\" then please provide more detail. No changes in my medical history.   HISTORY OF IMMUNOSUPPRESSION: Do you have a history of any of the following:  Systemic Immunosuppression such as Diabetes, Biologic or Immunotherapy, Chemotherapy, Organ Transplantation, Bone Marrow Transplantation?  No     Answering \"YES\" requires the addition of the dotphrase \"IMMUNOSUPPRESSED\" as the first diagnosis of the patient's visit.   FAMILY HISTORY:  Any \"first degree relatives\" (parent, brother, sister, or child) with the following?    No changes in my family's known health.   PATIENT EXPERIENCE:    Do you want the Dermatologist to perform a COMPLETE skin exam today including a clinical examination under the \"bra and underwear\" areas?  Yes  If necessary, do we have your permission to call and leave a detailed message on your Preferred Phone number that includes your specific medical information?  Yes      Allergies   Allergen Reactions    Cephalexin Rash      Current Outpatient Medications:     triamcinolone (KENALOG) 0.1 % ointment, Apply topically twice a day as needed for rash. Avoid face, groin. Do not use for more than 2 weeks at a time as can cause skin thinning, Disp: 453.6 g, Rfl: 1          Whom besides the patient is providing clinical information about today's encounter?   NO ADDITIONAL HISTORIAN (patient alone provided " "history)    Physical Exam and Assessment/Plan by Diagnosis:    Chief complaint: Patient is a 42 y/o male present for Atopic Dermatitis on the upper and lower extremities. Currently using Triamcinolone Ointment daily to spot treat active patches and doing well.    ATOPIC DERMATITIS (\"childhood Eczema\")    Physical Exam:  Anatomic Location Affected:  Ankles and arms  Morphological Description:  scaling erythematous areas  Body Surface Area Today:  less than 3%  Overall Severity: mild  Pertinent Positives:  Pertinent Negatives:    Additional History of Present Condition:  Pt has been more vigorous with topical treatment and doing well    Assessment and Plan:  Based on a thorough discussion of this condition and the management approach to it (including a comprehensive discussion of the known risks, side effects and potential benefits of treatment), the patient (family) agrees to implement the following specific plan:  Continue same therapy  Refill given  Follow up in 1 year     Assessment and Plan:   Atopic Dermatitis is a chronic, itchy skin condition that is very common in children but may occur at any age. It is also known as “eczema” or “atopic eczema.” It is the most common form of dermatitis.    Atopic dermatitis usually occurs in people who have an “atopic tendency.”  This means they may develop any or all of these closely linked conditions:  Atopic dermatitis, asthma, hay fever (allergic rhinitis), eosinophilic esophagitis, and gastroenteritis.  Often these conditions run within families with a parent, child or sibling also affected. A family history of asthma, eczema or hay fever is particularly useful in diagnosing atopic dermatitis in infants.    Atopic dermatitis arises because of a complex interaction of genetic and environmental factors. These include defects in skin barrier function making the skin more susceptible to irritation by soap and other contact irritants, the weather, temperature and " "non-specific triggers.  There is also an element of immune system dysregulation that is often present.  By definition, it is chronic and has a \"waxing-waning\" nature; flares should be expected but with good education and treatment strategies can be minimized.    Some specific tips we discussed:  Dry skin care.  Using only mild cleansers (hypoallergenic and without fragrances) and fragrance free detergent (not “unscented” products which contain a masking agent); we discussed avoiding irritants/fragranced products.  The importance of regular application of moisturizers daily (at least 3 times a day)  The known and theoretical side effects of steroids at length, including but not limited to atrophy of skin and increased pressure in eye (glaucoma) and clouding of the eye's lens (cataracts) if used in or around the eye for extended durations.  The specific over-the-counter interventions and medications.  Side effects, risks and benefits of topical and oral medications discussed.  After lengthy discussion of etiology and treatment options, we decided to implement the following personalized treatment plan:    Scribe Attestation      I,:  Gertrude Chacon am acting as a scribe while in the presence of the attending physician.:       I,:  Rowdy Huffman MD personally performed the services described in this documentation    as scribed in my presence.:               "

## 2024-04-17 NOTE — PATIENT INSTRUCTIONS
EDUCATION AS INTERVENTION!    WHAT IS ATOPIC DERMATITIS?  Atopic dermatitis (also called “eczema”) is a condition of the skin where the skin is dry, red, and itchy.  The main function of the skin is to provide a barrier from the environment and is also the first defense of the immune system.    In atopic dermatitis the skin barrier is decreased or disrupted, and the skin is easily irritated.  As a result, moisture escapes the skin more easily, and environmental allergens and microbes can enter the skin more easily.  Consequently, the skin's immune system is altered.  If there are increased allergic type cells in the skin, the skin may become red and “hyper-excitable.” This leads to itching and a subsequent rash.    WHY DO PEOPLE GET ATOPIC DERMATITIS?  There is no single answer because many factors are involved.  It is likely a combination of genetic makeup and environmental triggers and/or exposures. Excessive drying or sweating of the skin, Irritating soaps, dust mites, and pet dander are some of the more common triggers.  There is no blood test that can be done to confirm this diagnosis. The history and appearance of the skin is usually sufficient for a diagnosis. However, in some cases if the rash does not fit with the history or respond appropriately to treatment, a skin biopsy may be helpful.  Many children do outgrow atopic dermatitis or get better; however, many continue to have sensitive skin into adulthood.  Asthma and hay fever are often seen in many patients with atopic dermatitis; however, asthma flares do not necessarily occur at the same time as skin flares.     PREVENTING FLARES OF ATOPIC DERMATITIS  The first step is to maintain the skin's barrier function.  Keep the skin well moisturized.  Avoid irritants and triggers.  Use prescribed medicine when there are red or rough areas to help the skin to return to normal as quickly as possible.  Try to limit scratching.     If you keep the skin well  moisturized, and avoid coming in contact with things you know irritate your child's skin, there will be less flares.  However, some flares of atopic dermatitis are beyond your control.  You should work with your health care provider to come up with a plan that minimizes flares while minimizing long term use of medications that suppress the immune system.        WHAT ARE SOME OF THE TRIGGERS?  Triggers are different for different people. The most common triggers are:  Heat and sweat for some individuals, cold weather for others.  House dust mites, pet fur.  Wool; synthetic fabrics like nylon; dyed fabrics.  Tobacco smoke   Fragrances in: shampoos, soaps, lotions, laundry detergents, fabric softeners.  Saliva or prolonged exposure to water.    WHAT ABOUT FOOD ALLERGIES?  This is a very controversial topic, as many believe that food allergies are responsible for skin flares. In some cases, specific foods may cause worsening of atopic dermatitis; however this occurs in a minority of cases and usually happens within a few hours of ingestion. While food allergy is more common in children with eczema, foods are specific triggers for flares in only a small percentage of children.  If you notice that the skin flares after certain foods you can see if eliminating one food at time makes a difference, as long as your child can still enjoy a well-balanced diet.   There are blood (RAST) and skin (PRICK) tests that can check for allergies, but they are often positive in children who are not truly allergic. Therefore it is important that you work with your allergist and dermatologist to determine which foods are relevant and causing true symptoms.  Extreme food elimination diets without the guidance of your doctor, which have become more popular in recent years, may even result in worsening of the skin rash due to malnutrition and avoidance of essential nutrients.    TREATMENT  Treatments are aimed at minimizing exposure to  irritating factors and decreasing  the skin inflammation which results in an itchy rash.There are many different treatment options, which depend on your child's rash, its location, and severity.  Topical treatments include corticosteroids and steroid-like creams such as Protopic, Elidel, and Eucrisa, which are believed to not thin the skin.  Please read the discussions below regarding risks and benefits of all of these creams.    Occasionally bacterial or viral infections can occur which flare the skin and require oral and/or topical antibiotics or antivirals. In some cases bleach baths 2-3 times weekly can be helpful to prevent recurrent infection.    For severe disease, strong oral medications such as corticosteroids, methotrexate or azathioprine (Imuran) may be needed. These medications require close monitoring and follow-up. You should discuss the risks/ benefits/alternatives of these medications with your health care provider to come up with the best treatment plan for your child.    1) Use moisturizer all over the entire body at least THREE TIMES a day.  This keeps the skin moisturized to restore the barrier function.  Find a cream or ointment that your child likes - this is the most important.  The medicines do not work in the bottle.  The thicker the moisturizer, generally the better barrier it provides.  Ointments often moisturize better than creams; and creams work better than lotions.  Lotions are more useful during the summer when thick greasy ointments are uncomfortable.  If you put moisturizer on the skin after bathing, while the skin is damp, it is twice as effective.  The moisturizer provides a seal holding the water in the skin.  You may bathe your child in warm - not hot - water, for short periods of time (no more than 5-10 minutes at a time) once a day if they like.  Lightly pat your child dry with a towel and, while the skin is still damp, (within 3 minutes) apply a moisturizer from head to toe.   If your child is using a medicated cream, apply it and allow it to absorb completely BEFORE you apply the moisturizer.    2) Apply the prescription medication TWICE A DAY to only the red, rough areas on the skin OR AS DIRECTED BY YOUR HEALTH CARE PROVIDER  Put the medication on your fingers and gently rub it into the areas.  Usually the medicine will help an area within a few days time.  Try to put the medicine on for two days after you have noticed that the redness is no longer present; this will help the redness from returning.  The severity of the rash and the strength and usage of the medication will determine how quickly you see improvement.    It is important that you do not overuse steroid creams, and if you notice a thin, shiny appearance to the skin or broken blood vessels, you should stop using the cream and consult your health care provider regarding possible overuse/overthinning of the skin.  The face, armpits and groin have particularly thin and sensitive skin and are therefore most at risk for bad results if steroids are over-used in these sites.      3) Avoid triggers.    Some children have specific things that trigger itching and rashes, while others may have none that can be identified.  It may require a little bit of trial and error to see what applies to your child.  Also, triggers can change over time for your child. The most common triggers are listed above; start with these.  Avoid the use of fabric softeners in the washing machine or dryer sheets (unless they are fragrance-free).  Try to use laundry detergents, soaps and shampoos that are fragrance-free. You may find it helpful to double-rinse your clothes.  Some children are sensitive to house dust mites and they may benefit from a plastic mattress wrap.  While food allergy is more common in children with eczema, foods are specific triggers for flares in only a small percentage of children.  If you notice that the skin flares after certain  "foods you can see if eliminating one food at time makes a difference, as long as your child can still enjoy a well-balanced diet.     4) Consider using a medication like an anti-histamine by mouth to help control the itching.    Scratching only makes the skin more reactive and the barrier function even more disrupted.  It can cause both children and their parents to lose sleep!  There are different types of anti-itch medications.  Some cause more drowsiness than others.  Both types are acceptable depending on your child and your preference.  Start with Benadryl and if that does not work, ask for a prescription “antihistamine.”    5) About the prescription creams:  Corticosteroid creams and ointments (generally things with \"-one\" or \"keren\" on the end of their names):  The strength of the cream or ointment depends on the name of the active ingredient.  The numbers at the end do not indicate the relative strength.  Thus triamcinolone 0.1% ointment, considered a mid-strength corticosteroid, is much stronger than hydrocortisone 1% even though the number following the name is much lower.  Topical corticosteroids are very effective in treating atopic dermatitis.  When used in the manner prescribed (to rashy areas of skin and for no more than a few weeks at a time to any one area) they are very safe.  These are corticosteroids and are anti-inflammatory, not the “anabolic steroids” like those used illegally by some athletes.     Topical non-steroid creams and ointments (immunomodulators):  These creams and ointments are also called topical calcineurin inhibitors (TCIs).  These include Protopic ointment and Elidel cream. Crisaborole 2% (Eucrisa) is a prescription ointment that targets an enzyme called PDE4 (phosphodiesterase 4).  It is used on the skin topically to treat mild-to-moderate eczema in adults and children 2 years of age and older.  In total, these nonsteroidal prescriptions are used to help decrease itching and " redness in the skin.  They are not as strong as most steroid creams; however, it is believed that they do not thin the skin when overused.  They are generally used as second-line medications, though they may be used alone or in conjunction with topical steroids.  In sensitive areas such as the face, underarms or groin, they are often recommended.  They can sting inflamed skin, but are generally well tolerated once the skin is healing.    The FDA placed a “black-box” warning on both Elidel and Protopic in 2006 based on animal studies using the medications.  Some animals developed skin cancer and lymphoma.  Subsequently, the FDA released a statement that there is no causal relationship between the two medications and cancer.  Because of this concern, there are ongoing studies to evaluate this relationship in humans.  So far, there are studies that support the safety of these medications.  One showed that the rates of cancer in patient using these medications topically were less than the rates of the general population and another showed that in patient's using the medication over a large area of the body, the levels of the medication in the blood was undetectable.    As for Eucrisa, this product is only approved for the topical treatment of mild-to-moderate eczema in patients 2 years of age and older; use of the medication in kids younger than 2 is considered “off label” and has not been formally studied.  Burning and stinging are the most commonly reported side effects of this medication.  Rarely, this product has been known to cause hives and hypersensitivity reactions; discontinue its use if you develop severe itching, swelling, or redness in the area of application.

## 2025-02-12 DIAGNOSIS — R21 DIFFUSE PAPULAR RASH: ICD-10-CM

## 2025-02-12 RX ORDER — TRIAMCINOLONE ACETONIDE 1 MG/G
OINTMENT TOPICAL
Qty: 453.6 G | Status: CANCELLED | OUTPATIENT
Start: 2025-02-12

## 2025-02-12 NOTE — TELEPHONE ENCOUNTER
I pended order below. She was last seen in in April of last year with Dr. Huffman and is scheduled for a follow up in March with Dr. Margarita Camara.    Please review pended order before refilling

## 2025-02-12 NOTE — TELEPHONE ENCOUNTER
Received call from patient asking for refill on Triamcinolone.    Please send script to [facility] #[id number] - [place] - [address] [[id number]]     Please call patient to let him know once the script has been sent over

## 2025-02-15 RX ORDER — TRIAMCINOLONE ACETONIDE 1 MG/G
OINTMENT TOPICAL
Qty: 453.6 G | Refills: 1 | Status: SHIPPED | OUTPATIENT
Start: 2025-02-15

## 2025-04-07 ENCOUNTER — OFFICE VISIT (OUTPATIENT)
Dept: FAMILY MEDICINE CLINIC | Facility: CLINIC | Age: 45
End: 2025-04-07
Payer: COMMERCIAL

## 2025-04-07 VITALS
HEIGHT: 73 IN | HEART RATE: 91 BPM | WEIGHT: 255.4 LBS | RESPIRATION RATE: 18 BRPM | TEMPERATURE: 97.3 F | SYSTOLIC BLOOD PRESSURE: 132 MMHG | DIASTOLIC BLOOD PRESSURE: 80 MMHG | OXYGEN SATURATION: 99 % | BODY MASS INDEX: 33.85 KG/M2

## 2025-04-07 DIAGNOSIS — Z72.0 SMOKING TRYING TO QUIT: ICD-10-CM

## 2025-04-07 DIAGNOSIS — E66.9 OBESITY (BMI 30-39.9): Primary | ICD-10-CM

## 2025-04-07 DIAGNOSIS — L20.82 FLEXURAL ECZEMA: ICD-10-CM

## 2025-04-07 DIAGNOSIS — R21 DIFFUSE PAPULAR RASH: ICD-10-CM

## 2025-04-07 PROCEDURE — 99213 OFFICE O/P EST LOW 20 MIN: CPT | Performed by: FAMILY MEDICINE

## 2025-04-07 RX ORDER — TRIAMCINOLONE ACETONIDE 1 MG/G
OINTMENT TOPICAL
Qty: 453.6 G | Refills: 1 | Status: SHIPPED | OUTPATIENT
Start: 2025-04-07

## 2025-04-07 RX ORDER — TRIAMCINOLONE ACETONIDE 1 MG/G
OINTMENT TOPICAL
Qty: 80 G | Refills: 1 | Status: SHIPPED | OUTPATIENT
Start: 2025-04-07 | End: 2025-04-07

## 2025-04-07 NOTE — PROGRESS NOTES
Name: Ravindra Bran      : 1980      MRN: [id number]  Encounter Provider: Jose Peacock MD  Encounter Date: 2025   Encounter department: [facility]  :  Assessment & Plan  Flexural eczema         Diffuse papular rash    Refill of kenalog works well for patient for exczema.    Orders:    triamcinolone (KENALOG) 0.1 % ointment; Apply topically twice a day as needed for rash. Avoid face, groin. Do not use for more than 2 weeks at a time as can cause skin thinning    Obesity (BMI 30-39.9)      Check labs and [facility] for routine physical.    Orders:    Lipid panel; Future    Hemoglobin A1C; Future    Comprehensive metabolic panel; Future    Smoking trying to quit    Discussed cessation             Depression Screening and Follow-up Plan: Patient was screened for depression during today's encounter. They screened negative with a PHQ-2 score of 0.      Tobacco Cessation Counseling: Tobacco cessation counseling was provided. The patient is sincerely urged to quit consumption of tobacco. He is ready to quit tobacco. Medication options and side effects of medication discussed. Patient refused medication.     History of Present Illness   44 year old presnting in follow up of eczema, he received a call to [facility] which I think was from dermatology and made appointment to [place].    He had rash last week same as before when diagnoised with eczema he has used triamcinolone and no longer has a rash.    He has otherwise been feeling well      Medication Refill  Pertinent negatives include no abdominal pain, arthralgias, chest pain, chills, coughing, fever, rash, sore throat or vomiting.     Review of Systems   Constitutional:  Negative for chills and fever.   HENT:  Negative for ear pain and sore throat.    Eyes:  Negative for pain and visual disturbance.   Respiratory:  Negative for cough and shortness of breath.    Cardiovascular:  Negative for chest pain and  "palpitations.   Gastrointestinal:  Negative for abdominal pain and vomiting.   Genitourinary:  Negative for dysuria and hematuria.   Musculoskeletal:  Negative for arthralgias and back pain.   Skin:  Negative for color change and rash.   Neurological:  Negative for seizures and syncope.   All other systems reviewed and are negative.      Objective   /80 (BP Location: Left arm, Patient Position: Sitting, Cuff Size: Large)   Pulse 91   Temp (!) 97.3 °F (36.3 °C) (Tympanic)   Resp 18   Ht 6' 1\" (1.854 m)   Wt 116 kg (255 lb 6.4 oz)   SpO2 99%   BMI 33.70 kg/m²      Physical Exam  Vitals and nursing note reviewed.   Constitutional:       General: He is not in acute distress.     Appearance: He is well-developed.   HENT:      Head: Normocephalic and atraumatic.   Eyes:      Conjunctiva/sclera: Conjunctivae normal.   Cardiovascular:      Rate and Rhythm: Normal rate and regular rhythm.      Heart sounds: No murmur heard.  Pulmonary:      Effort: Pulmonary effort is normal. No respiratory distress.      Breath sounds: Normal breath sounds.   Abdominal:      Palpations: Abdomen is soft.      Tenderness: There is no abdominal tenderness.   Musculoskeletal:         General: No swelling.      Cervical back: Neck supple.   Skin:     General: Skin is warm and dry.      Capillary Refill: Capillary refill takes less than 2 seconds.   Neurological:      Mental Status: He is alert.   Psychiatric:         Mood and Affect: Mood normal.         "

## 2025-04-07 NOTE — ASSESSMENT & PLAN NOTE
Refill of kenalog works well for patient for exczema.    Orders:    triamcinolone (KENALOG) 0.1 % ointment; Apply topically twice a day as needed for rash. Avoid face, groin. Do not use for more than 2 weeks at a time as can cause skin thinning

## 2025-07-10 ENCOUNTER — TELEPHONE (OUTPATIENT)
Dept: FAMILY MEDICINE CLINIC | Facility: CLINIC | Age: 45
End: 2025-07-10